# Patient Record
Sex: FEMALE | Race: ASIAN | NOT HISPANIC OR LATINO | Employment: PART TIME | URBAN - METROPOLITAN AREA
[De-identification: names, ages, dates, MRNs, and addresses within clinical notes are randomized per-mention and may not be internally consistent; named-entity substitution may affect disease eponyms.]

---
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
## 2023-02-09 ENCOUNTER — MYC REFILL (OUTPATIENT)
Dept: FAMILY MEDICINE | Facility: CLINIC | Age: 18
End: 2023-02-09

## 2023-02-09 DIAGNOSIS — B07.9 VIRAL WARTS, UNSPECIFIED TYPE: Primary | ICD-10-CM

## 2023-02-10 NOTE — TELEPHONE ENCOUNTER
Routing refill request to provider for review/approval because:  Drug not on the [facility] refill protocol   A break in medication  Patient needs to be seen because it has been more than 1 year since last office visit.    Last Written Prescription Date:  2/22/2018  Last Fill Quantity: 15 g,  # refills: 1   Last office visit provider:  6/29/21     Requested Prescriptions   Pending Prescriptions Disp Refills     salicylic acid (COMPOUND W MAX STRENGTH) 17 % external gel 15 g 1       There is no refill protocol information for this order          Michel Villalobos RN 02/10/23 11:21 AM

## 2023-10-08 ENCOUNTER — HEALTH MAINTENANCE LETTER (OUTPATIENT)
Age: 18
End: 2023-10-08

## 2023-11-19 ENCOUNTER — APPOINTMENT (OUTPATIENT)
Dept: RADIOLOGY | Facility: HOSPITAL | Age: 18
End: 2023-11-19
Payer: COMMERCIAL

## 2023-11-19 ENCOUNTER — HOSPITAL ENCOUNTER (EMERGENCY)
Facility: HOSPITAL | Age: 18
Discharge: HOME | End: 2023-11-19
Attending: EMERGENCY MEDICINE | Admitting: EMERGENCY MEDICINE
Payer: COMMERCIAL

## 2023-11-19 VITALS
HEART RATE: 84 BPM | WEIGHT: 154 LBS | SYSTOLIC BLOOD PRESSURE: 135 MMHG | BODY MASS INDEX: 30.04 KG/M2 | TEMPERATURE: 98.8 F | RESPIRATION RATE: 16 BRPM | OXYGEN SATURATION: 99 % | DIASTOLIC BLOOD PRESSURE: 88 MMHG

## 2023-11-19 DIAGNOSIS — S02.2XXA CLOSED FRACTURE OF NASAL BONE, INITIAL ENCOUNTER: ICD-10-CM

## 2023-11-19 PROCEDURE — 99283 EMERGENCY DEPT VISIT LOW MDM: CPT | Mod: 25

## 2023-11-19 PROCEDURE — 250N000013 HC RX MED GY IP 250 OP 250 PS 637

## 2023-11-19 PROCEDURE — 12011 RPR F/E/E/N/L/M 2.5 CM/<: CPT

## 2023-11-19 PROCEDURE — 70160 X-RAY EXAM OF NASAL BONES: CPT

## 2023-11-19 RX ORDER — IBUPROFEN 600 MG/1
600 TABLET, FILM COATED ORAL ONCE
Status: COMPLETED | OUTPATIENT
Start: 2023-11-19 | End: 2023-11-19

## 2023-11-19 RX ADMIN — IBUPROFEN 600 MG: 600 TABLET, FILM COATED ORAL at 16:59

## 2023-11-19 ASSESSMENT — ACTIVITIES OF DAILY LIVING (ADL): ADLS_ACUITY_SCORE: 35

## 2023-11-19 NOTE — ED PROVIDER NOTES
Emergency Department Encounter   11/19/2023  3:42 PM    CHIEF COMPLAINT: Facial Laceration      IMPRESSION AND PLAN   MEDICAL DECISION MAKING: Alan Hutton is a 18 year old female with no significant medical history who presents to the ED by vehicle for evaluation of facial trauma.  Within the last hour, patient was playing soccer and was struck in the face by a soccer cleat resulting in a small laceration on her nose and significant bleeding from her nose.  Patient is not on blood thinners.  There is no seizure, vomiting, or loss of consciousness following injury.  Currently, epistaxis has ceased.  Per patient, pain is manageable.  Patient is able to explain entire event in her own words, no retrograde amnesia.    Vitals reviewed and hemodynamically stable, mildly tachycardic. On exam patient is well-appearing and in no acute distress.  GCS 15.  There is no evidence of open or depressed skull fracture.  No evidence of basilar skull fracture.  There is no nasal deformity or bony crepitus.  Tenderness and swelling is isolated to the bony bridge of nose.  The patient can breathe through the right right nare but not the left.  There is no nasal deviation.  Epistaxis has ceased.  There is no periorbital ecchymosis or findings suggesting orbital injury.  No septal hematoma.  Patient is able to breathe out of the right nare but not the left nare.  There is a very small 0.5 cm, superficial laceration on the nasal bridge of the nose.    Using the [place] CT head injury/trauma rule, CT head is unnecessary.  I have minimal concern for more extensive injury in the vicinity of the nose or severe facial trauma as there is no diffuse tenderness outside of the nasal bridge, therefore I did not get CT facial bones.  XR nasal bones revealed mildly displaced nasal bone fractures with soft tissue swelling Nasal laceration was repaired with Dermabond and Steri-Strip.  Patient tolerated procedure well.    Patient advised to follow-up with  ENT in 3 to 7 days for additional evaluation.  Vies ibuprofen and acetaminophen for pain relief at home.  Encouraged frequent icing and keeping head upright.  Encouraged frequent blowing of nose.    Medical Decision Making    History:  Supplemental history from: Documented in chart, if applicable and Friend  External Record(s) reviewed: Documented in chart, if applicable.    Work Up:  Chart documentation includes differential considered and any EKGs or imaging independently interpreted by provider, where specified.  In additional to work up documented, I considered the following work up: Documented in chart, if applicable.    External consultation:  Discussion of management with another provider: Documented in chart, if applicable    Complicating factors:  Care impacted by chronic illness: N/A  Care affected by social determinants of health: N/A    Disposition considerations: Discharge. No recommendations on prescription strength medication(s). See documentation for any additional details.      EMERGENCY DEPARTMENT COURSE:  3:55 PM I met and introduced myself to the patient. I gathered initial history and performed my physical exam. We discussed plan for initial workup.   4:15 PM I have staffed the patient with Dr. Kirstie Polanco, ED MD, who has evaluated the patient and agrees with all aspects of today's care.   5:43 PM I rechecked the patient and discussed results, discharge, follow up, and reasons to return to the ED.     At the conclusion of the encounter I discussed the results of all the tests and the disposition. The questions were answered. The patient or family acknowledged understanding and was agreeable with the care plan.    FINAL IMPRESSION:    ICD-10-CM    1. Closed fracture of nasal bone, initial encounter  S02.2XXA             MEDICATIONS GIVEN IN THE EMERGENCY DEPARTMENT:  Medications   ibuprofen (ADVIL/MOTRIN) tablet 600 mg (600 mg Oral $Given 11/19/23 8520)         NEW PRESCRIPTIONS STARTED AT  TODAY'S ED VISIT:  New Prescriptions    No medications on file         HISTORY OF PRESENT ILLNESS   Patient information was obtained from: patient   Use of Intrepreter: N/A     Mindit ADÁN Hutton is a 18 year old female with no significant medical history who presents to the ED by vehicle for evaluation of facial trauma.  This afternoon while playing soccer, patient was struck in the nose by a soccer cleat.  This occurred around 3:30 PM.  After the injury, patient's nose was bleeding from the nares bilaterally.  Since being in the emergency department, bleeding has ceased. Patient denies blood thinner use, seizure after injury, and vomiting after injury.  Was no loss of consciousness.    MEDICAL HISTORY     No past medical history on file.    Past Surgical History:   Procedure Laterality Date    EYE SURGERY Left     In [facility].  globe repair, age 4       Family History   Problem Relation Age of Onset    Asthma Mother        Social History     Tobacco Use    Smoking status: Passive Smoke Exposure - Never Smoker    Smokeless tobacco: Never    Tobacco comments:     Dad smokes outside       clindamycin (CLINDAGEL) 1 % gel  salicylic acid (COMPOUND W MAX STRENGTH) 17 % external gel  tretinoin (RETIN-A) 0.01 % external gel          PHYSICAL EXAM     Vitals:  Patient Vitals for the past 24 hrs:   BP Temp Temp src Pulse Resp SpO2 Weight   11/19/23 1539 (!) 150/94 98.8  F (37.1  C) Temporal 104 20 100 % 69.9 kg (154 lb)         PHYSICAL EXAM:   Physical Exam  Constitutional:       Appearance: She is not ill-appearing or toxic-appearing.   HENT:      Head: Normocephalic and atraumatic. No raccoon eyes, Napier's sign, right periorbital erythema or left periorbital erythema.      Jaw: No tenderness.      Right Ear: Tympanic membrane and ear canal normal. No hemotympanum.      Left Ear: Tympanic membrane and ear canal normal. No hemotympanum.      Nose: Signs of injury, laceration and nasal tenderness present. No nasal deformity or  septal deviation.      Right Nostril: No septal hematoma.      Left Nostril: No septal hematoma.      Right Sinus: No maxillary sinus tenderness or frontal sinus tenderness.      Left Sinus: No maxillary sinus tenderness or frontal sinus tenderness.         RESULTS     LAB:  All pertinent labs reviewed and interpreted  Labs Ordered and Resulted from Time of ED Arrival to Time of ED Departure - No data to display    RADIOLOGY:  XR Nasal Bones 3 Views   Final Result   IMPRESSION: Mildly displaced nasal bone fractures with soft tissue swelling.          PROCEDURES:  PROCEDURE: Laceration Repair   INDICATIONS: Laceration   PROCEDURE PROVIDER: Kari Stewart PA-C   SITE: Nasal bridge   TYPE/SIZE: simple, superficial, clean, and no foreign body visualized  0.5 cm (total length)   FUNCTIONAL ASSESSMENT: Distal sensation, circulation, and motor intact   MEDICATION: None.   PREPARATION: irrigation with Tap water   DEBRIDEMENT: no debridement   CLOSURE:  Superficial layer closed with Steri-strips and Dermabond (medical glue)    Total number of steri-strips placed: 1         Kari Stewart PA-C   Emergency Medicine   [facility] EMERGENCY DEPARTMENT       Kari Stewart PA-C  11/19/23 0479

## 2023-11-19 NOTE — DISCHARGE INSTRUCTIONS
You have been evaluated in the emergency department today for nasal pain following an injury to the face.  Your nasal bone x-ray revealed a mildly displaced nasal bone fracture  with soft tissue swelling.  Your nose is not deformed or displaced enough to do a reduction in the emergency department today.      Please follow-up promptly with an ear nose and throat (ENT) doctor within 3 to 7 days.  For pain management at home, I recommend ice and how elevation.  You can take over-the-counter pain relievers such as ibuprofen and acetaminophen.     Call 911 anytime you think you may need emergency care. For example, call if:    You have trouble breathing.     You passed out (lost consciousness).   Call your doctor now or seek immediate medical care if:    You have signs of infection, such as:  Increased pain, swelling, warmth, or redness.  Red streaks leading from the area.  Pus draining from the area.  A fever.     You have clear fluid draining from your nose.     You have vision changes.     You have swelling or a bump on the thin wall (nasal septum) between the nostrils of your nose.     Your nose is bleeding.     You have new or worse pain.   Watch closely for changes in your health, and be sure to contact your doctor if:    You do not get better as expected.

## 2023-11-19 NOTE — Clinical Note
Brennen was seen and treated in our emergency department on 11/19/2023.  She may return to school on 11/22/2023.  Please make accommodations to reschedule exams.    If you have any questions or concerns, please don't hesitate to call.      Kari Stewart, JESENIA

## 2023-11-19 NOTE — ED PROVIDER NOTES
I, Kirstie Polanco MD have reviewed the documentation, personally taken the patient's history, performed an exam and agree with the physical finds, diagnosis and management plan.  I saw this patient with Kari Stewart PA-C.  ED Course as of 11/19/23 1745   Sun Nov 19, 2023   1641 Patient is a 18-year-old female who comes in today for evaluation of a laceration on her nose.  She was playing soccer and got hit in the nose with a cleat.  She has a small 5 mm laceration.  She has tenderness over the bridge of the nose with some swelling but no obvious deformity.  There is no septal hematoma.  We will get a nasal bone x-ray and repair the laceration likely with just glue.  I discussed this with the patient.  She is in agreement with the plan will be discharged home afterward.   1745 Patient has a mildly displaced nasal bone fracture.  She can follow-up with ENT as an outpatient.       I personally saw the patient and performed a substantive portion of the visit including all aspects of the medical decision making.      Kirstie Polanco MD  11/19/23 1745

## 2023-11-19 NOTE — ED TRIAGE NOTES
Patient here after kicked in the nose with a soccer cleat while playing soccer today, has laceration there. Patient reports numbness in the nose and a lot of epistaxis after the event.

## 2023-12-08 ENCOUNTER — HOSPITAL ENCOUNTER (EMERGENCY)
Facility: CLINIC | Age: 18
End: 2023-12-08
Payer: COMMERCIAL

## 2023-12-08 NOTE — ED TRIAGE NOTES
Pt presents with complaints of lower abd pain. Pt started menstrual cycle today. Pt reports she is feeling faint with pain.

## 2024-08-06 ENCOUNTER — OFFICE VISIT (OUTPATIENT)
Dept: FAMILY MEDICINE | Facility: CLINIC | Age: 19
End: 2024-08-06
Payer: COMMERCIAL

## 2024-08-06 VITALS
SYSTOLIC BLOOD PRESSURE: 108 MMHG | WEIGHT: 153.2 LBS | HEART RATE: 91 BPM | HEIGHT: 60 IN | TEMPERATURE: 98 F | BODY MASS INDEX: 30.08 KG/M2 | DIASTOLIC BLOOD PRESSURE: 67 MMHG | RESPIRATION RATE: 16 BRPM | OXYGEN SATURATION: 99 %

## 2024-08-06 DIAGNOSIS — Z12.4 CERVICAL CANCER SCREENING: ICD-10-CM

## 2024-08-06 DIAGNOSIS — L70.0 ACNE VULGARIS: ICD-10-CM

## 2024-08-06 DIAGNOSIS — B07.9 VIRAL WARTS, UNSPECIFIED TYPE: ICD-10-CM

## 2024-08-06 DIAGNOSIS — Z11.3 SCREENING FOR STDS (SEXUALLY TRANSMITTED DISEASES): ICD-10-CM

## 2024-08-06 DIAGNOSIS — Z00.00 ROUTINE GENERAL MEDICAL EXAMINATION AT A HEALTH CARE FACILITY: Primary | ICD-10-CM

## 2024-08-06 DIAGNOSIS — Z11.1 SCREENING EXAMINATION FOR PULMONARY TUBERCULOSIS: ICD-10-CM

## 2024-08-06 PROCEDURE — 90471 IMMUNIZATION ADMIN: CPT | Performed by: PHYSICIAN ASSISTANT

## 2024-08-06 PROCEDURE — 36415 COLL VENOUS BLD VENIPUNCTURE: CPT | Performed by: PHYSICIAN ASSISTANT

## 2024-08-06 PROCEDURE — 99214 OFFICE O/P EST MOD 30 MIN: CPT | Mod: 25 | Performed by: PHYSICIAN ASSISTANT

## 2024-08-06 PROCEDURE — 87591 N.GONORRHOEAE DNA AMP PROB: CPT | Performed by: PHYSICIAN ASSISTANT

## 2024-08-06 PROCEDURE — 87491 CHLMYD TRACH DNA AMP PROBE: CPT | Performed by: PHYSICIAN ASSISTANT

## 2024-08-06 PROCEDURE — 86481 TB AG RESPONSE T-CELL SUSP: CPT | Performed by: PHYSICIAN ASSISTANT

## 2024-08-06 PROCEDURE — 99385 PREV VISIT NEW AGE 18-39: CPT | Mod: 25 | Performed by: PHYSICIAN ASSISTANT

## 2024-08-06 PROCEDURE — G0145 SCR C/V CYTO,THINLAYER,RESCR: HCPCS | Performed by: PHYSICIAN ASSISTANT

## 2024-08-06 PROCEDURE — 90715 TDAP VACCINE 7 YRS/> IM: CPT | Performed by: PHYSICIAN ASSISTANT

## 2024-08-06 RX ORDER — CLINDAMYCIN PHOSPHATE 10 MG/G
GEL TOPICAL 2 TIMES DAILY
Qty: 30 G | Refills: 3 | Status: SHIPPED | OUTPATIENT
Start: 2024-08-06

## 2024-08-06 RX ORDER — TRETINOIN 0.1 MG/G
GEL TOPICAL AT BEDTIME
Qty: 45 G | Refills: 11 | Status: SHIPPED | OUTPATIENT
Start: 2024-08-06

## 2024-08-06 SDOH — HEALTH STABILITY: PHYSICAL HEALTH: ON AVERAGE, HOW MANY DAYS PER WEEK DO YOU ENGAGE IN MODERATE TO STRENUOUS EXERCISE (LIKE A BRISK WALK)?: 3 DAYS

## 2024-08-06 ASSESSMENT — SOCIAL DETERMINANTS OF HEALTH (SDOH): HOW OFTEN DO YOU GET TOGETHER WITH FRIENDS OR RELATIVES?: ONCE A WEEK

## 2024-08-06 NOTE — PROGRESS NOTES
"Preventive Care Visit  [facility]  Shelby Hathaway PA-C, Physician Assistant - Medical  Aug 6, 2024      Assessment & Plan     Routine general medical examination at a health care facility  Pap updated today.  Tdap updated today.  Form filled out for school.      Viral warts, unspecified type  Chronic issue, stable, meds refilled.  - salicylic acid (COMPOUND W MAX STRENGTH) 17 % external gel  Dispense: 15 g; Refill: 1    Acne vulgaris  Chronic issue, stable, meds refilled.  - clindamycin (CLEOCIN-T) 1 % external gel  Dispense: 30 g; Refill: 3  - tretinoin (RETIN-A) 0.01 % external gel  Dispense: 45 g; Refill: 11    Screening for STDs (sexually transmitted diseases)  Asymptomatic, updated today.  - Chlamydia trachomatis/Neisseria gonorrhoeae by PCR- VAGINAL SELF-SWAB    Cervical cancer screening  Pap done today, first pap. Plan repeat in 3 years if negative.  - Pap Screen Only - Recommended Age 21 - 24 Years    Screening examination for pulmonary tuberculosis  TB screening done for school.  - Quantiferon TB Gold Plus      Patient has been advised of split billing requirements and indicates understanding: Yes        BMI  Estimated body mass index is 29.67 kg/m  as calculated from the following:    Height as of this encounter: 1.53 m (5' 0.25\").    Weight as of this encounter: 69.5 kg (153 lb 3.2 oz).   Weight management plan: Discussed healthy diet and exercise guidelines    Counseling  Appropriate preventive services were addressed with this patient via screening, questionnaire, or discussion as appropriate for fall prevention, nutrition, physical activity, Tobacco-use cessation, weight loss and cognition.  Checklist reviewing preventive services available has been given to the patient.  Reviewed patient's diet, addressing concerns and/or questions.   She is at risk for lack of exercise and has been provided with information to increase physical activity for the benefit of her " well-being.   The patient was instructed to see the dentist every 6 months.       Risks, benefits and alternatives were discussed with patient. Agreeable to the plan of care.      Subjective   Hsat is a 19 year old, presenting for the following:  Physical        8/6/2024     1:26 PM   Additional Questions   Roomed by DAMARIS Vee CMA([facility])         8/6/2024   Forms   Any forms needing to be completed Yes           Health Care Directive  Patient does not have a Health Care Directive or Living Will: Discussed advance care planning with patient; however, patient declined at this time.    HPI        8/6/2024   General Health   How would you rate your overall physical health? Good   Feel stress (tense, anxious, or unable to sleep) To some extent      (!) STRESS CONCERN      8/6/2024   Nutrition   Three or more servings of calcium each day? Yes   Diet: Regular (no restrictions)   How many servings of fruit and vegetables per day? (!) 2-3   How many sweetened beverages each day? 0-1            8/6/2024   Exercise   Days per week of moderate/strenous exercise 3 days            8/6/2024   Social Factors   Frequency of gathering with friends or relatives Once a week   Worry food won't last until get money to buy more No   Food not last or not have enough money for food? No   Do you have housing? (Housing is defined as stable permanent housing and does not include staying ouside in a car, in a tent, in an abandoned building, in an overnight shelter, or couch-surfing.) Yes   Are you worried about losing your housing? No   Lack of transportation? No   Unable to get utilities (heat,electricity)? No            8/6/2024   Dental   Dentist two times every year? (!) NO            8/6/2024   TB Screening   Were you born outside of the [place]? Yes            Today's PHQ-2 Score:       8/6/2024     1:00 PM   PHQ-2 ( 1999 [employer])   Q1: Little interest or pleasure in doing things 1   Q2: Feeling down, depressed or hopeless 0   PHQ-2 Score 1   Q1:  "Little interest or pleasure in doing things Several days   Q2: Feeling down, depressed or hopeless Not at all   PHQ-2 Score 1           8/6/2024   Substance Use   Alcohol more than 3/day or more than 7/wk No   Do you use any other substances recreationally? No        Social History     Tobacco Use    Smoking status: Passive Smoke Exposure - Never Smoker    Smokeless tobacco: Never    Tobacco comments:     Dad smokes outside             8/6/2024   One time HIV Screening   Previous HIV test? No          8/6/2024   STI Screening   New sexual partner(s) since last STI/HIV test? No        History of abnormal Pap smear: No - age 21-29 PAP every 3 years recommended             8/6/2024   Contraception/Family Planning   Questions about contraception or family planning No           Reviewed and updated as needed this visit by Provider                    Patient Active Problem List   Diagnosis    Right knee pain     Past Surgical History:   Procedure Laterality Date    EYE SURGERY Left     In [place].  globe repair, age 4       Social History     Tobacco Use    Smoking status: Passive Smoke Exposure - Never Smoker    Smokeless tobacco: Never    Tobacco comments:     Dad smokes outside   Substance Use Topics    Alcohol use: Not on file     Family History   Problem Relation Age of Onset    Asthma Mother          Current Outpatient Medications   Medication Sig Dispense Refill    clindamycin (CLEOCIN-T) 1 % external gel Apply topically 2 times daily 30 g 3    salicylic acid (COMPOUND W MAX STRENGTH) 17 % external gel Apply topically daily 15 g 1    tretinoin (RETIN-A) 0.01 % external gel Apply topically at bedtime 45 g 11     No Known Allergies      Review of Systems  Constitutional, HEENT, cardiovascular, pulmonary, gi and gu systems are negative, except as otherwise noted.     Objective    Exam  /67   Pulse 91   Temp 98  F (36.7  C) (Oral)   Resp 16   Ht 1.53 m (5' 0.25\")   Wt 69.5 kg (153 lb 3.2 oz)   LMP " "07/23/2024 (Exact Date)   SpO2 99%   BMI 29.67 kg/m     Estimated body mass index is 29.67 kg/m  as calculated from the following:    Height as of this encounter: 1.53 m (5' 0.25\").    Weight as of this encounter: 69.5 kg (153 lb 3.2 oz).    Physical Exam  GENERAL: alert and no distress  EYES: Eyes grossly normal to inspection, PERRL and conjunctivae and sclerae normal  HENT: ear canals and TM's normal, nose and mouth without ulcers or lesions  NECK: no adenopathy, no asymmetry, masses, or scars  RESP: lungs clear to auscultation - no rales, rhonchi or wheezes  BREAST: normal without masses, tenderness or nipple discharge and no palpable axillary masses or adenopathy  CV: regular rate and rhythm, normal S1 S2, no S3 or S4, no murmur, click or rub, no peripheral edema  ABDOMEN: soft, nontender, no hepatosplenomegaly, no masses and bowel sounds normal   (female): normal female external genitalia, normal urethral meatus, normal vaginal mucosa  MS: no gross musculoskeletal defects noted, no edema  SKIN: no suspicious lesions or rashes  NEURO: Normal strength and tone, mentation intact and speech normal  PSYCH: mentation appears normal, affect normal/bright        Signed Electronically by: Shelby Hathaway PA-C    "

## 2024-08-06 NOTE — PATIENT INSTRUCTIONS
Patient Education   Preventive Care Advice   This is general advice given by our system to help you stay healthy. However, your care team may have specific advice just for you. Please talk to your care team about your preventive care needs.  Nutrition  Eat 5 or more servings of fruits and vegetables each day.  Try wheat bread, brown rice and whole grain pasta (instead of white bread, rice, and pasta).  Get enough calcium and vitamin D. Check the label on foods and aim for 100% of the RDA (recommended daily allowance).  Lifestyle  Exercise at least 150 minutes each week  (30 minutes a day, 5 days a week).  Do muscle strengthening activities 2 days a week. These help control your weight and prevent disease.  No smoking.  Wear sunscreen to prevent skin cancer.  Have a dental exam and cleaning every 6 months.  Yearly exams  See your health care team every year to talk about:  Any changes in your health.  Any medicines your care team has prescribed.  Preventive care, family planning, and ways to prevent chronic diseases.  Shots (vaccines)   HPV shots (up to age 26), if you've never had them before.  Hepatitis B shots (up to age 59), if you've never had them before.  COVID-19 shot: Get this shot when it's due.  Flu shot: Get a flu shot every year.  Tetanus shot: Get a tetanus shot every 10 years.  Pneumococcal, hepatitis A, and RSV shots: Ask your care team if you need these based on your risk.  Shingles shot (for age 50 and up)  General health tests  Diabetes screening:  Starting at age 35, Get screened for diabetes at least every 3 years.  If you are younger than age 35, ask your care team if you should be screened for diabetes.  Cholesterol test: At age 39, start having a cholesterol test every 5 years, or more often if advised.  Bone density scan (DEXA): At age 50, ask your care team if you should have this scan for osteoporosis (brittle bones).  Hepatitis C: Get tested at least once in your life.  STIs (sexually  transmitted infections)  Before age 24: Ask your care team if you should be screened for STIs.  After age 24: Get screened for STIs if you're at risk. You are at risk for STIs (including HIV) if:  You are sexually active with more than one person.  You don't use condoms every time.  You or a partner was diagnosed with a sexually transmitted infection.  If you are at risk for HIV, ask about PrEP medicine to prevent HIV.  Get tested for HIV at least once in your life, whether you are at risk for HIV or not.  Cancer screening tests  Cervical cancer screening: If you have a cervix, begin getting regular cervical cancer screening tests starting at age 21.  Breast cancer scan (mammogram): If you've ever had breasts, begin having regular mammograms starting at age 40. This is a scan to check for breast cancer.  Colon cancer screening: It is important to start screening for colon cancer at age 45.  Have a colonoscopy test every 10 years (or more often if you're at risk) Or, ask your provider about stool tests like a FIT test every year or Cologuard test every 3 years.  To learn more about your testing options, visit:   .  For help making a decision, visit:   https://bit.ly/ml67041.  Prostate cancer screening test: If you have a prostate, ask your care team if a prostate cancer screening test (PSA) at age 55 is right for you.  Lung cancer screening: If you are a current or former smoker ages 50 to 80, ask your care team if ongoing lung cancer screenings are right for you.  For informational purposes only. Not to replace the advice of your health care provider. Copyright   2023 [employer]. All rights reserved. Clinically reviewed by the [facility]. [employer] 934228 - REV 01/24.

## 2024-08-06 NOTE — PROGRESS NOTES
Prior to immunization administration, verified patients identity using patient s name and date of birth. Please see Immunization Activity for additional information.     Screening Questionnaire for Adult Immunization    Are you sick today?   No   Do you have allergies to medications, food, a vaccine component or latex?   No   Have you ever had a serious reaction after receiving a vaccination?   No   Do you have a long-term health problem with heart, lung, kidney, or metabolic disease (e.g., diabetes), asthma, a blood disorder, no spleen, complement component deficiency, a cochlear implant, or a spinal fluid leak?  Are you on long-term aspirin therapy?   No   Do you have cancer, leukemia, HIV/AIDS, or any other immune system problem?   No   Do you have a parent, brother, or sister with an immune system problem?   No   In the past 3 months, have you taken medications that affect  your immune system, such as prednisone, other steroids, or anticancer drugs; drugs for the treatment of rheumatoid arthritis, Crohn s disease, or psoriasis; or have you had radiation treatments?   No   Have you had a seizure, or a brain or other nervous system problem?   No   During the past year, have you received a transfusion of blood or blood    products, or been given immune (gamma) globulin or antiviral drug?   No   For women: Are you pregnant or is there a chance you could become       pregnant during the next month?   No   Have you received any vaccinations in the past 4 weeks?   No     Immunization questionnaire answers were all negative.      Patient instructed to remain in clinic for 15 minutes afterwards, and to report any adverse reactions.     Screening performed by Ana Paula Vee MA on 8/6/2024 at 2:03 PM.

## 2024-08-07 LAB
C TRACH DNA SPEC QL PROBE+SIG AMP: NEGATIVE
N GONORRHOEA DNA SPEC QL NAA+PROBE: NEGATIVE

## 2024-08-08 LAB
GAMMA INTERFERON BACKGROUND BLD IA-ACNC: 0.08 IU/ML
M TB IFN-G BLD-IMP: NEGATIVE
M TB IFN-G CD4+ BCKGRND COR BLD-ACNC: 9.92 IU/ML
MITOGEN IGNF BCKGRD COR BLD-ACNC: 0.02 IU/ML
MITOGEN IGNF BCKGRD COR BLD-ACNC: 0.03 IU/ML
QUANTIFERON MITOGEN: 10 IU/ML
QUANTIFERON NIL TUBE: 0.08 IU/ML
QUANTIFERON TB1 TUBE: 0.1 IU/ML
QUANTIFERON TB2 TUBE: 0.11

## 2024-08-09 LAB
BKR LAB AP GYN ADEQUACY: NORMAL
BKR LAB AP GYN INTERPRETATION: NORMAL
BKR LAB AP HPV REFLEX: NO
BKR LAB AP LMP: NORMAL
BKR LAB AP PREVIOUS ABNORMAL: NORMAL
PATH REPORT.COMMENTS IMP SPEC: NORMAL
PATH REPORT.COMMENTS IMP SPEC: NORMAL
PATH REPORT.RELEVANT HX SPEC: NORMAL

## 2025-06-06 LAB
ALBUMIN SERPL BCG-MCNC: 4.5 G/DL (ref 3.5–5.2)
ALP SERPL-CCNC: 51 U/L (ref 40–150)
ALT SERPL W P-5'-P-CCNC: 45 U/L (ref 0–50)
ANION GAP SERPL CALCULATED.3IONS-SCNC: 15 MMOL/L (ref 7–15)
AST SERPL W P-5'-P-CCNC: 22 U/L (ref 0–45)
BASOPHILS # BLD AUTO: 0 10E3/UL (ref 0–0.2)
BASOPHILS NFR BLD AUTO: 0 %
BILIRUB SERPL-MCNC: 0.5 MG/DL
BUN SERPL-MCNC: 12.8 MG/DL (ref 6–20)
CALCIUM SERPL-MCNC: 9.5 MG/DL (ref 8.8–10.4)
CHLORIDE SERPL-SCNC: 99 MMOL/L (ref 98–107)
CREAT SERPL-MCNC: 0.89 MG/DL (ref 0.51–0.95)
EGFRCR SERPLBLD CKD-EPI 2021: >90 ML/MIN/1.73M2
EOSINOPHIL # BLD AUTO: 0 10E3/UL (ref 0–0.7)
EOSINOPHIL NFR BLD AUTO: 0 %
ERYTHROCYTE [DISTWIDTH] IN BLOOD BY AUTOMATED COUNT: 12.7 % (ref 10–15)
GLUCOSE SERPL-MCNC: 123 MG/DL (ref 70–99)
HCG SERPL QL: NEGATIVE
HCO3 SERPL-SCNC: 19 MMOL/L (ref 22–29)
HCT VFR BLD AUTO: 38.5 % (ref 35–47)
HGB BLD-MCNC: 12.5 G/DL (ref 11.7–15.7)
IMM GRANULOCYTES # BLD: 0.1 10E3/UL
IMM GRANULOCYTES NFR BLD: 1 %
LACTATE SERPL-SCNC: 3.2 MMOL/L (ref 0.7–2)
LYMPHOCYTES # BLD AUTO: 0.5 10E3/UL (ref 0.8–5.3)
LYMPHOCYTES NFR BLD AUTO: 4 %
MAGNESIUM SERPL-MCNC: 1.7 MG/DL (ref 1.7–2.3)
MCH RBC QN AUTO: 26.8 PG (ref 26.5–33)
MCHC RBC AUTO-ENTMCNC: 32.5 G/DL (ref 31.5–36.5)
MCV RBC AUTO: 83 FL (ref 78–100)
MONOCYTES # BLD AUTO: 0.5 10E3/UL (ref 0–1.3)
MONOCYTES NFR BLD AUTO: 4 %
NEUTROPHILS # BLD AUTO: 10.8 10E3/UL (ref 1.6–8.3)
NEUTROPHILS NFR BLD AUTO: 91 %
NRBC # BLD AUTO: 0 10E3/UL
NRBC BLD AUTO-RTO: 0 /100
PLATELET # BLD AUTO: 522 10E3/UL (ref 150–450)
POTASSIUM SERPL-SCNC: 4 MMOL/L (ref 3.4–5.3)
PROT SERPL-MCNC: 8.1 G/DL (ref 6.4–8.3)
RBC # BLD AUTO: 4.66 10E6/UL (ref 3.8–5.2)
SODIUM SERPL-SCNC: 133 MMOL/L (ref 135–145)
WBC # BLD AUTO: 12 10E3/UL (ref 4–11)

## 2025-06-06 PROCEDURE — 83735 ASSAY OF MAGNESIUM: CPT | Performed by: EMERGENCY MEDICINE

## 2025-06-06 PROCEDURE — 80048 BASIC METABOLIC PNL TOTAL CA: CPT | Performed by: EMERGENCY MEDICINE

## 2025-06-06 PROCEDURE — 84703 CHORIONIC GONADOTROPIN ASSAY: CPT | Performed by: EMERGENCY MEDICINE

## 2025-06-06 PROCEDURE — 96361 HYDRATE IV INFUSION ADD-ON: CPT | Performed by: EMERGENCY MEDICINE

## 2025-06-06 PROCEDURE — 258N000003 HC RX IP 258 OP 636: Performed by: EMERGENCY MEDICINE

## 2025-06-06 PROCEDURE — 83605 ASSAY OF LACTIC ACID: CPT | Performed by: EMERGENCY MEDICINE

## 2025-06-06 PROCEDURE — 36415 COLL VENOUS BLD VENIPUNCTURE: CPT | Performed by: EMERGENCY MEDICINE

## 2025-06-06 PROCEDURE — 99285 EMERGENCY DEPT VISIT HI MDM: CPT | Mod: 25 | Performed by: EMERGENCY MEDICINE

## 2025-06-06 PROCEDURE — 85004 AUTOMATED DIFF WBC COUNT: CPT | Performed by: EMERGENCY MEDICINE

## 2025-06-06 RX ORDER — KETOROLAC TROMETHAMINE 15 MG/ML
15 INJECTION, SOLUTION INTRAMUSCULAR; INTRAVENOUS ONCE
Status: COMPLETED | OUTPATIENT
Start: 2025-06-07 | End: 2025-06-07

## 2025-06-06 RX ORDER — ONDANSETRON 2 MG/ML
4 INJECTION INTRAMUSCULAR; INTRAVENOUS ONCE
Status: COMPLETED | OUTPATIENT
Start: 2025-06-07 | End: 2025-06-07

## 2025-06-06 RX ADMIN — SODIUM CHLORIDE 1000 ML: 0.9 INJECTION, SOLUTION INTRAVENOUS at 22:52

## 2025-06-06 ASSESSMENT — COLUMBIA-SUICIDE SEVERITY RATING SCALE - C-SSRS
1. IN THE PAST MONTH, HAVE YOU WISHED YOU WERE DEAD OR WISHED YOU COULD GO TO SLEEP AND NOT WAKE UP?: NO
2. HAVE YOU ACTUALLY HAD ANY THOUGHTS OF KILLING YOURSELF IN THE PAST MONTH?: NO
6. HAVE YOU EVER DONE ANYTHING, STARTED TO DO ANYTHING, OR PREPARED TO DO ANYTHING TO END YOUR LIFE?: NO

## 2025-06-07 ENCOUNTER — HOSPITAL ENCOUNTER (EMERGENCY)
Facility: HOSPITAL | Age: 20
Discharge: HOME | End: 2025-06-07
Attending: EMERGENCY MEDICINE | Admitting: EMERGENCY MEDICINE
Payer: COMMERCIAL

## 2025-06-07 ENCOUNTER — HOSPITAL ENCOUNTER (OUTPATIENT)
Facility: HOSPITAL | Age: 20
Setting detail: OBSERVATION
Discharge: HOME | End: 2025-06-07
Attending: EMERGENCY MEDICINE | Admitting: INTERNAL MEDICINE
Payer: COMMERCIAL

## 2025-06-07 ENCOUNTER — APPOINTMENT (OUTPATIENT)
Dept: CT IMAGING | Facility: HOSPITAL | Age: 20
End: 2025-06-07
Attending: EMERGENCY MEDICINE
Payer: COMMERCIAL

## 2025-06-07 VITALS
RESPIRATION RATE: 18 BRPM | HEART RATE: 99 BPM | SYSTOLIC BLOOD PRESSURE: 110 MMHG | DIASTOLIC BLOOD PRESSURE: 66 MMHG | OXYGEN SATURATION: 98 % | HEIGHT: 60 IN | WEIGHT: 168 LBS | TEMPERATURE: 99 F | BODY MASS INDEX: 32.98 KG/M2

## 2025-06-07 VITALS
WEIGHT: 167.7 LBS | HEART RATE: 101 BPM | HEIGHT: 60 IN | TEMPERATURE: 99.4 F | OXYGEN SATURATION: 98 % | BODY MASS INDEX: 32.93 KG/M2 | SYSTOLIC BLOOD PRESSURE: 101 MMHG | DIASTOLIC BLOOD PRESSURE: 62 MMHG | RESPIRATION RATE: 16 BRPM

## 2025-06-07 DIAGNOSIS — R19.7 VOMITING AND DIARRHEA: ICD-10-CM

## 2025-06-07 DIAGNOSIS — R10.84 GENERALIZED ABDOMINAL PAIN: ICD-10-CM

## 2025-06-07 DIAGNOSIS — R11.2 NAUSEA AND VOMITING, UNSPECIFIED VOMITING TYPE: ICD-10-CM

## 2025-06-07 DIAGNOSIS — R11.10 VOMITING AND DIARRHEA: ICD-10-CM

## 2025-06-07 DIAGNOSIS — R19.7 DIARRHEA, UNSPECIFIED TYPE: ICD-10-CM

## 2025-06-07 DIAGNOSIS — R42 LIGHTHEADEDNESS: ICD-10-CM

## 2025-06-07 DIAGNOSIS — K52.9 GASTROENTERITIS: ICD-10-CM

## 2025-06-07 DIAGNOSIS — R00.0 TACHYCARDIA: ICD-10-CM

## 2025-06-07 PROBLEM — E87.20 LACTIC ACIDOSIS: Status: ACTIVE | Noted: 2025-06-07

## 2025-06-07 PROBLEM — B07.8 OTHER VIRAL WARTS: Status: RESOLVED | Noted: 2025-06-07 | Resolved: 2025-06-07

## 2025-06-07 PROBLEM — E87.1 HYPONATREMIA: Status: ACTIVE | Noted: 2025-06-07

## 2025-06-07 PROBLEM — E86.1 HYPOTENSION DUE TO HYPOVOLEMIA: Status: ACTIVE | Noted: 2025-06-07

## 2025-06-07 PROBLEM — M25.561 RIGHT KNEE PAIN: Status: RESOLVED | Noted: 2018-01-31 | Resolved: 2025-06-07

## 2025-06-07 PROBLEM — A41.9 SEPSIS WITHOUT ACUTE ORGAN DYSFUNCTION (H): Status: ACTIVE | Noted: 2025-06-07

## 2025-06-07 PROBLEM — A09 ENTERITIS OF INFECTIOUS ORIGIN: Status: ACTIVE | Noted: 2025-06-07

## 2025-06-07 PROBLEM — R65.10 SIRS (SYSTEMIC INFLAMMATORY RESPONSE SYNDROME) (H): Status: ACTIVE | Noted: 2025-06-07

## 2025-06-07 PROBLEM — R31.9 HEMATURIA: Status: RESOLVED | Noted: 2017-04-24 | Resolved: 2025-06-07

## 2025-06-07 PROBLEM — L70.8 OTHER ACNE: Status: RESOLVED | Noted: 2018-01-11 | Resolved: 2025-06-07

## 2025-06-07 PROBLEM — S89.91XA RIGHT KNEE INJURY: Status: RESOLVED | Noted: 2018-01-11 | Resolved: 2025-06-07

## 2025-06-07 LAB
ALBUMIN SERPL BCG-MCNC: 3.8 G/DL (ref 3.5–5.2)
ALBUMIN UR-MCNC: 20 MG/DL
ALP SERPL-CCNC: 42 U/L (ref 40–150)
ALT SERPL W P-5'-P-CCNC: 35 U/L (ref 0–50)
ANION GAP SERPL CALCULATED.3IONS-SCNC: 13 MMOL/L (ref 7–15)
APPEARANCE UR: ABNORMAL
AST SERPL W P-5'-P-CCNC: 24 U/L (ref 0–45)
BACTERIA #/AREA URNS HPF: ABNORMAL /HPF
BASOPHILS # BLD AUTO: 0 10E3/UL (ref 0–0.2)
BASOPHILS NFR BLD AUTO: 0 %
BILIRUB DIRECT SERPL-MCNC: 0.11 MG/DL (ref 0–0.3)
BILIRUB SERPL-MCNC: 0.3 MG/DL
BILIRUB UR QL STRIP: NEGATIVE
BUN SERPL-MCNC: 11.1 MG/DL (ref 6–20)
CALCIUM SERPL-MCNC: 8.8 MG/DL (ref 8.8–10.4)
CHLORIDE SERPL-SCNC: 105 MMOL/L (ref 98–107)
COLOR UR AUTO: ABNORMAL
CREAT SERPL-MCNC: 0.91 MG/DL (ref 0.51–0.95)
EGFRCR SERPLBLD CKD-EPI 2021: >90 ML/MIN/1.73M2
EOSINOPHIL # BLD AUTO: 0 10E3/UL (ref 0–0.7)
EOSINOPHIL NFR BLD AUTO: 0 %
ERYTHROCYTE [DISTWIDTH] IN BLOOD BY AUTOMATED COUNT: 12.8 % (ref 10–15)
GLUCOSE SERPL-MCNC: 113 MG/DL (ref 70–99)
GLUCOSE UR STRIP-MCNC: NEGATIVE MG/DL
HCO3 SERPL-SCNC: 16 MMOL/L (ref 22–29)
HCT VFR BLD AUTO: 35.8 % (ref 35–47)
HGB BLD-MCNC: 11.7 G/DL (ref 11.7–15.7)
HGB UR QL STRIP: NEGATIVE
HOLD SPECIMEN: NORMAL
IMM GRANULOCYTES # BLD: 0 10E3/UL
IMM GRANULOCYTES NFR BLD: 1 %
KETONES UR STRIP-MCNC: NEGATIVE MG/DL
LACTATE SERPL-SCNC: 1.1 MMOL/L (ref 0.7–2)
LACTATE SERPL-SCNC: 1.8 MMOL/L (ref 0.7–2)
LACTATE SERPL-SCNC: 2.3 MMOL/L (ref 0.7–2)
LEUKOCYTE ESTERASE UR QL STRIP: ABNORMAL
LIPASE SERPL-CCNC: 16 U/L (ref 13–60)
LYMPHOCYTES # BLD AUTO: 0.7 10E3/UL (ref 0.8–5.3)
LYMPHOCYTES NFR BLD AUTO: 13 %
MAGNESIUM SERPL-MCNC: 1.7 MG/DL (ref 1.7–2.3)
MCH RBC QN AUTO: 27.1 PG (ref 26.5–33)
MCHC RBC AUTO-ENTMCNC: 32.7 G/DL (ref 31.5–36.5)
MCV RBC AUTO: 83 FL (ref 78–100)
MONOCYTES # BLD AUTO: 0.3 10E3/UL (ref 0–1.3)
MONOCYTES NFR BLD AUTO: 6 %
MUCOUS THREADS #/AREA URNS LPF: PRESENT /LPF
NEUTROPHILS # BLD AUTO: 4.4 10E3/UL (ref 1.6–8.3)
NEUTROPHILS NFR BLD AUTO: 81 %
NITRATE UR QL: NEGATIVE
NRBC # BLD AUTO: 0 10E3/UL
NRBC BLD AUTO-RTO: 0 /100
PH UR STRIP: 5.5 [PH] (ref 5–7)
PLATELET # BLD AUTO: 407 10E3/UL (ref 150–450)
POTASSIUM SERPL-SCNC: 3.8 MMOL/L (ref 3.4–5.3)
PROT SERPL-MCNC: 7.3 G/DL (ref 6.4–8.3)
RBC # BLD AUTO: 4.31 10E6/UL (ref 3.8–5.2)
RBC URINE: 0 /HPF
SODIUM SERPL-SCNC: 134 MMOL/L (ref 135–145)
SP GR UR STRIP: 1.01 (ref 1–1.03)
SQUAMOUS EPITHELIAL: 4 /HPF
UROBILINOGEN UR STRIP-MCNC: NORMAL MG/DL
WBC # BLD AUTO: 5.4 10E3/UL (ref 4–11)
WBC URINE: 12 /HPF
YEAST #/AREA URNS HPF: ABNORMAL /HPF

## 2025-06-07 PROCEDURE — 83690 ASSAY OF LIPASE: CPT | Performed by: EMERGENCY MEDICINE

## 2025-06-07 PROCEDURE — 96361 HYDRATE IV INFUSION ADD-ON: CPT

## 2025-06-07 PROCEDURE — 250N000013 HC RX MED GY IP 250 OP 250 PS 637: Performed by: EMERGENCY MEDICINE

## 2025-06-07 PROCEDURE — 96376 TX/PRO/DX INJ SAME DRUG ADON: CPT

## 2025-06-07 PROCEDURE — 36415 COLL VENOUS BLD VENIPUNCTURE: CPT | Performed by: EMERGENCY MEDICINE

## 2025-06-07 PROCEDURE — 250N000011 HC RX IP 250 OP 636: Performed by: EMERGENCY MEDICINE

## 2025-06-07 PROCEDURE — 74177 CT ABD & PELVIS W/CONTRAST: CPT

## 2025-06-07 PROCEDURE — 87086 URINE CULTURE/COLONY COUNT: CPT | Performed by: EMERGENCY MEDICINE

## 2025-06-07 PROCEDURE — 258N000003 HC RX IP 258 OP 636: Performed by: EMERGENCY MEDICINE

## 2025-06-07 PROCEDURE — 80048 BASIC METABOLIC PNL TOTAL CA: CPT | Performed by: EMERGENCY MEDICINE

## 2025-06-07 PROCEDURE — 85025 COMPLETE CBC W/AUTO DIFF WBC: CPT | Performed by: EMERGENCY MEDICINE

## 2025-06-07 PROCEDURE — 83605 ASSAY OF LACTIC ACID: CPT | Performed by: EMERGENCY MEDICINE

## 2025-06-07 PROCEDURE — 81001 URINALYSIS AUTO W/SCOPE: CPT | Performed by: EMERGENCY MEDICINE

## 2025-06-07 PROCEDURE — 96374 THER/PROPH/DIAG INJ IV PUSH: CPT

## 2025-06-07 PROCEDURE — 83735 ASSAY OF MAGNESIUM: CPT | Performed by: EMERGENCY MEDICINE

## 2025-06-07 PROCEDURE — 87040 BLOOD CULTURE FOR BACTERIA: CPT | Performed by: EMERGENCY MEDICINE

## 2025-06-07 PROCEDURE — 82248 BILIRUBIN DIRECT: CPT | Performed by: EMERGENCY MEDICINE

## 2025-06-07 PROCEDURE — 99285 EMERGENCY DEPT VISIT HI MDM: CPT | Mod: 25

## 2025-06-07 PROCEDURE — G0378 HOSPITAL OBSERVATION PER HR: HCPCS

## 2025-06-07 PROCEDURE — 96375 TX/PRO/DX INJ NEW DRUG ADDON: CPT | Mod: 59 | Performed by: EMERGENCY MEDICINE

## 2025-06-07 PROCEDURE — 96374 THER/PROPH/DIAG INJ IV PUSH: CPT | Mod: 59 | Performed by: EMERGENCY MEDICINE

## 2025-06-07 PROCEDURE — 96361 HYDRATE IV INFUSION ADD-ON: CPT | Performed by: EMERGENCY MEDICINE

## 2025-06-07 RX ORDER — AMOXICILLIN 250 MG
2 CAPSULE ORAL 2 TIMES DAILY PRN
Status: DISCONTINUED | OUTPATIENT
Start: 2025-06-07 | End: 2025-06-08 | Stop reason: HOSPADM

## 2025-06-07 RX ORDER — DICYCLOMINE HCL 20 MG
20 TABLET ORAL ONCE
Status: COMPLETED | OUTPATIENT
Start: 2025-06-07 | End: 2025-06-07

## 2025-06-07 RX ORDER — HYDROXYZINE HYDROCHLORIDE 25 MG/1
25 TABLET, FILM COATED ORAL EVERY 6 HOURS PRN
Status: DISCONTINUED | OUTPATIENT
Start: 2025-06-07 | End: 2025-06-08 | Stop reason: HOSPADM

## 2025-06-07 RX ORDER — IOPAMIDOL 755 MG/ML
82 INJECTION, SOLUTION INTRAVASCULAR ONCE
Status: COMPLETED | OUTPATIENT
Start: 2025-06-07 | End: 2025-06-07

## 2025-06-07 RX ORDER — HYDROXYZINE HYDROCHLORIDE 50 MG/1
50 TABLET, FILM COATED ORAL EVERY 6 HOURS PRN
Status: DISCONTINUED | OUTPATIENT
Start: 2025-06-07 | End: 2025-06-08 | Stop reason: HOSPADM

## 2025-06-07 RX ORDER — NALOXONE HYDROCHLORIDE 0.4 MG/ML
0.2 INJECTION, SOLUTION INTRAMUSCULAR; INTRAVENOUS; SUBCUTANEOUS
Status: DISCONTINUED | OUTPATIENT
Start: 2025-06-07 | End: 2025-06-08 | Stop reason: HOSPADM

## 2025-06-07 RX ORDER — POLYETHYLENE GLYCOL 3350 17 G/17G
17 POWDER, FOR SOLUTION ORAL 2 TIMES DAILY PRN
Status: DISCONTINUED | OUTPATIENT
Start: 2025-06-07 | End: 2025-06-08 | Stop reason: HOSPADM

## 2025-06-07 RX ORDER — PROCHLORPERAZINE MALEATE 10 MG
10 TABLET ORAL EVERY 6 HOURS PRN
Status: DISCONTINUED | OUTPATIENT
Start: 2025-06-07 | End: 2025-06-08 | Stop reason: HOSPADM

## 2025-06-07 RX ORDER — ONDANSETRON 2 MG/ML
4 INJECTION INTRAMUSCULAR; INTRAVENOUS ONCE
Status: COMPLETED | OUTPATIENT
Start: 2025-06-07 | End: 2025-06-07

## 2025-06-07 RX ORDER — ACETAMINOPHEN 325 MG/1
650 TABLET ORAL EVERY 4 HOURS PRN
Status: DISCONTINUED | OUTPATIENT
Start: 2025-06-07 | End: 2025-06-08 | Stop reason: HOSPADM

## 2025-06-07 RX ORDER — SODIUM CHLORIDE 9 MG/ML
INJECTION, SOLUTION INTRAVENOUS ONCE
Status: COMPLETED | OUTPATIENT
Start: 2025-06-07 | End: 2025-06-07

## 2025-06-07 RX ORDER — HYDROMORPHONE HCL IN WATER/PF 6 MG/30 ML
0.4 PATIENT CONTROLLED ANALGESIA SYRINGE INTRAVENOUS
Refills: 0 | Status: DISCONTINUED | OUTPATIENT
Start: 2025-06-07 | End: 2025-06-08 | Stop reason: HOSPADM

## 2025-06-07 RX ORDER — LOPERAMIDE HYDROCHLORIDE 2 MG/1
2 TABLET ORAL 4 TIMES DAILY PRN
Qty: 14 TABLET | Refills: 0 | Status: SHIPPED | OUTPATIENT
Start: 2025-06-07 | End: 2025-06-23

## 2025-06-07 RX ORDER — NALOXONE HYDROCHLORIDE 0.4 MG/ML
0.4 INJECTION, SOLUTION INTRAMUSCULAR; INTRAVENOUS; SUBCUTANEOUS
Status: DISCONTINUED | OUTPATIENT
Start: 2025-06-07 | End: 2025-06-08 | Stop reason: HOSPADM

## 2025-06-07 RX ORDER — IBUPROFEN 600 MG/1
600 TABLET, FILM COATED ORAL EVERY 6 HOURS PRN
Status: DISCONTINUED | OUTPATIENT
Start: 2025-06-07 | End: 2025-06-08 | Stop reason: HOSPADM

## 2025-06-07 RX ORDER — OXYCODONE HYDROCHLORIDE 5 MG/1
5 TABLET ORAL EVERY 4 HOURS PRN
Refills: 0 | Status: DISCONTINUED | OUTPATIENT
Start: 2025-06-07 | End: 2025-06-08 | Stop reason: HOSPADM

## 2025-06-07 RX ORDER — ACETAMINOPHEN 650 MG/1
650 SUPPOSITORY RECTAL EVERY 4 HOURS PRN
Status: DISCONTINUED | OUTPATIENT
Start: 2025-06-07 | End: 2025-06-08 | Stop reason: HOSPADM

## 2025-06-07 RX ORDER — DICYCLOMINE HCL 20 MG
20 TABLET ORAL 4 TIMES DAILY PRN
Qty: 30 TABLET | Refills: 0 | Status: SHIPPED | OUTPATIENT
Start: 2025-06-07 | End: 2025-06-17

## 2025-06-07 RX ORDER — IBUPROFEN 200 MG
200 TABLET ORAL EVERY 4 HOURS PRN
COMMUNITY
End: 2025-06-23

## 2025-06-07 RX ORDER — ONDANSETRON 4 MG/1
4 TABLET, ORALLY DISINTEGRATING ORAL EVERY 6 HOURS PRN
Status: DISCONTINUED | OUTPATIENT
Start: 2025-06-07 | End: 2025-06-08 | Stop reason: HOSPADM

## 2025-06-07 RX ORDER — ONDANSETRON 2 MG/ML
4 INJECTION INTRAMUSCULAR; INTRAVENOUS EVERY 6 HOURS PRN
Status: DISCONTINUED | OUTPATIENT
Start: 2025-06-07 | End: 2025-06-08 | Stop reason: HOSPADM

## 2025-06-07 RX ORDER — DICYCLOMINE HYDROCHLORIDE 10 MG/1
20 CAPSULE ORAL 4 TIMES DAILY PRN
Status: DISCONTINUED | OUTPATIENT
Start: 2025-06-08 | End: 2025-06-08 | Stop reason: HOSPADM

## 2025-06-07 RX ORDER — PIPERACILLIN SODIUM, TAZOBACTAM SODIUM 4; .5 G/20ML; G/20ML
4.5 INJECTION, POWDER, LYOPHILIZED, FOR SOLUTION INTRAVENOUS ONCE
Status: DISCONTINUED | OUTPATIENT
Start: 2025-06-07 | End: 2025-06-07

## 2025-06-07 RX ORDER — HYDROMORPHONE HCL IN WATER/PF 6 MG/30 ML
0.2 PATIENT CONTROLLED ANALGESIA SYRINGE INTRAVENOUS
Refills: 0 | Status: DISCONTINUED | OUTPATIENT
Start: 2025-06-07 | End: 2025-06-08 | Stop reason: HOSPADM

## 2025-06-07 RX ORDER — AMOXICILLIN 250 MG
1 CAPSULE ORAL 2 TIMES DAILY PRN
Status: DISCONTINUED | OUTPATIENT
Start: 2025-06-07 | End: 2025-06-08 | Stop reason: HOSPADM

## 2025-06-07 RX ORDER — ONDANSETRON 4 MG/1
4 TABLET, ORALLY DISINTEGRATING ORAL EVERY 8 HOURS PRN
Qty: 20 TABLET | Refills: 0 | Status: SHIPPED | OUTPATIENT
Start: 2025-06-07 | End: 2025-06-14

## 2025-06-07 RX ADMIN — DICYCLOMINE HYDROCHLORIDE 20 MG: 20 TABLET ORAL at 16:36

## 2025-06-07 RX ADMIN — DICYCLOMINE HYDROCHLORIDE 20 MG: 20 TABLET ORAL at 22:04

## 2025-06-07 RX ADMIN — KETOROLAC TROMETHAMINE 15 MG: 15 INJECTION, SOLUTION INTRAMUSCULAR; INTRAVENOUS at 00:26

## 2025-06-07 RX ADMIN — SODIUM CHLORIDE: 0.9 INJECTION, SOLUTION INTRAVENOUS at 22:03

## 2025-06-07 RX ADMIN — ONDANSETRON 4 MG: 2 INJECTION, SOLUTION INTRAMUSCULAR; INTRAVENOUS at 16:36

## 2025-06-07 RX ADMIN — SODIUM CHLORIDE 1000 ML: 0.9 INJECTION, SOLUTION INTRAVENOUS at 18:06

## 2025-06-07 RX ADMIN — SODIUM CHLORIDE 1000 ML: 0.9 INJECTION, SOLUTION INTRAVENOUS at 16:35

## 2025-06-07 RX ADMIN — ONDANSETRON 4 MG: 2 INJECTION, SOLUTION INTRAMUSCULAR; INTRAVENOUS at 22:03

## 2025-06-07 RX ADMIN — FAMOTIDINE 20 MG: 10 INJECTION, SOLUTION INTRAVENOUS at 00:24

## 2025-06-07 RX ADMIN — IOPAMIDOL 82 ML: 755 INJECTION, SOLUTION INTRAVENOUS at 01:01

## 2025-06-07 RX ADMIN — ONDANSETRON 4 MG: 2 INJECTION, SOLUTION INTRAMUSCULAR; INTRAVENOUS at 00:22

## 2025-06-07 RX ADMIN — SODIUM CHLORIDE 1000 ML: 0.9 INJECTION, SOLUTION INTRAVENOUS at 01:21

## 2025-06-07 ASSESSMENT — ACTIVITIES OF DAILY LIVING (ADL)
ADLS_ACUITY_SCORE: 47
ADLS_ACUITY_SCORE: 41
ADLS_ACUITY_SCORE: 47
ADLS_ACUITY_SCORE: 41
ADLS_ACUITY_SCORE: 47
ADLS_ACUITY_SCORE: 41
ADLS_ACUITY_SCORE: 41
ADLS_ACUITY_SCORE: 47

## 2025-06-07 ASSESSMENT — COLUMBIA-SUICIDE SEVERITY RATING SCALE - C-SSRS
1. IN THE PAST MONTH, HAVE YOU WISHED YOU WERE DEAD OR WISHED YOU COULD GO TO SLEEP AND NOT WAKE UP?: NO
6. HAVE YOU EVER DONE ANYTHING, STARTED TO DO ANYTHING, OR PREPARED TO DO ANYTHING TO END YOUR LIFE?: NO
2. HAVE YOU ACTUALLY HAD ANY THOUGHTS OF KILLING YOURSELF IN THE PAST MONTH?: NO

## 2025-06-07 NOTE — ED PROVIDER NOTES
EMERGENCY DEPARTMENT ENCOUNTER      NAME: Alan Hutton  AGE: 20 year old female  YOB: 2005  EVALUATION DATE & TIME: No admission date for patient encounter.    ED PROVIDER: Ginna Choe MD    Chief Complaint   Patient presents with    Abdominal Pain       FINAL IMPRESSION  1. Gastroenteritis    2. Nausea and vomiting, unspecified vomiting type    3. Diarrhea, unspecified type    4. Generalized abdominal pain        MEDICAL DECISION MAKING   Alan Hutton is a 20 year old female who presents for evaluation of nausea, vomiting, diarrhea, abdominal pain, and fever.  Patient reports onset of symptoms earlier this morning.  She reports that she also did have a fever but took ibuprofen and had resolution thereafter.  She has had some sick contacts with similar symptoms.  No history of abdominal surgeries, kidney stones, or kidney infections.  Vitals on arrival notable for tachycardia but otherwise stable reassuring.    Outside records reviewed.  Patient seen for routine wellness exam on 8/6/2024.  Reviewed that note.  No recent ED or clinic visits for related complaints.    I considered a broad differential including but not limited to GERD, PUD, gastritis, pancreatitis, hepatobiliary disease, gastroenteritis, appendicitis, diverticulitis, ureterolithiasis, pyelonephritis, cystitis, sepsis/bacteremia. Discussed options for workup with the patient. We agreed on plan for labs, UA, CT abdomen/pelvis, and management of symptoms with IV analgesic/antiemetic.     ED Course as of 06/07/25 0504   Sat Jun 07, 2025   0049 Lactic Acid(!): 3.2  Lactate elevated, certainly concerning for infectious etiology given report of fever and tachycardia noted on arrival.  Will continue IV fluids and await results of CT scan   0050 CBC with platelets + differential(!)  CBC with leukocytosis and WBC of 12.0, concerning for infectious/inflammatory process, especially in light of reported fever and tachycardia noted on arrival.  No acute  anemia.   0050 Comprehensive metabolic panel(!)  CMP reassuring. No evidence of GOLDEN, acidosis, or significant electrolyte derangement. No acute elevation of bilirubin or transaminates to suggest acute hepatobiliary process.   0050 Magnesium: 1.7  Magnesium within normal limits, reassuring   0146 Lactic Acid: 1.8  Repeat lactate normalized. Reassuring.    0504 CT Abdomen Pelvis w Contrast  CT with evidence of enteritis but no appendicitis or other acute intra-abdominal process.     Workup today was generally reassuring, as above.  I rechecked the patient multiple times.  She had significant improvement in symptoms with initial interventions.  Tachycardia noted on arrival did resolve with fluids and lactate normalized.  Although I did initially order antibiotics, after CT scan revealed evidence of enteritis, I do not believe that these would necessarily be required.  I suspect symptoms are more viral related and elevated lactate more secondary to fluid losses/GI symptoms rather than systemic infectious/inflammatory process.  Out of abundance of caution, we will plan to draw blood cultures.  I did offer admission, but patient would rather go home and rest which I believe is very reasonable.  She has been able to tolerate p.o. here and feels comfortable managing her symptoms conservatively for now.  Will send with prescriptions for Imodium and Zofran  And have her follow-up closely with PCP.    At the end of the encounter, we reviewed the results, potential diagnoses, as well as return precautions and recommendations for follow up. I instructed Ms. Hutton to return to the emergency department immediately if she develops any new or worsening symptoms and provided additional verbal discharge instructions. Ms. Hutton expressed understanding and agreement with this plan of care, her questions were answered, and she was discharged in stable condition.      Additional Considerations in MDM  History:  Supplemental history from:  N/A  External Record(s) reviewed: Wellness exam 8/6/2024    Work Up:  Chart documentation includes differential diagnoses considered and any EKGs or imaging independently interpreted as specified above.   In additional to work up documented, I considered additional advanced imaging and laboratory workup but deferred after shared decision making conversation with patient/family    External Consultation(s):  Discussion of management with another provider as documented above and in ED course     Chronic Illness(es):  Care impacted by chronic illness(es): N/A    Disposition considerations: Discharge. I prescribed additional prescription strength medication(s) as charted. I considered admission, but ultimately discharged patient after shared decision making, p.o. challenge, and discussion with patient.    MIPS: Not Applicable     Sepsis/STEMI/Stroke: Lactic acid elevated due to viral illness and GI losses. At this time there are no signs of sepsis or septic shock      ED COURSE  11:27 PM I met with the patient, obtained history, performed an initial exam, and discussed options and plan for diagnostics and treatment here in the ED.   1:00 AM I rechecked the patient.   1:17 AM I went to go update the patient on the current plan for their care.  1:47 AM I went to go update the patient on the current plan for their care.  3:24 AM We discussed the plan for discharge and the patient is agreeable. Reviewed supportive cares, symptomatic treatment, outpatient follow up, and reasons to return to the Emergency Department. Patient to be discharged by ED RN.      MEDICATIONS GIVEN IN THE ED  Medications   sodium chloride 0.9% BOLUS 1,000 mL (0 mLs Intravenous Stopped 6/7/25 0027)   famotidine (PEPCID) injection 20 mg (20 mg Intravenous $Given 6/7/25 0024)   ondansetron (ZOFRAN) injection 4 mg (4 mg Intravenous $Given 6/7/25 0022)   ketorolac (TORADOL) injection 15 mg (15 mg Intravenous $Given 6/7/25 0026)   sodium chloride 0.9% BOLUS  1,000 mL (0 mLs Intravenous Stopped 6/7/25 9707)   iopamidol (ISOVUE-370) solution 82 mL (82 mLs Intravenous $Given 6/7/25 0101)       NEW PRESCRIPTIONS STARTED AT TODAY'S VISIT  Discharge Medication List as of 6/7/2025  3:32 AM        START taking these medications    Details   loperamide (IMODIUM A-D) 2 MG tablet Take 1 tablet (2 mg) by mouth 4 times daily as needed for diarrhea., Disp-14 tablet, R-0, E-Prescribe      ondansetron (ZOFRAN ODT) 4 MG ODT tab Take 1 tablet (4 mg) by mouth every 8 hours as needed for nausea., Disp-20 tablet, R-0, E-Prescribe                =================================================================    HPI:    Use of : N/A      Hsat ADÁN Hutton is a 20 year old female who presents with vomiting, diarrhea, and abdominal.    Per patient, at 4:00 AM this morning (06/06/2025) she developed diarrhea and vomiting. She also has abdominal pain and fatigue. This morning she had a fever which she took ibuprofen for. She was around other people that got sick around the same time as her.    She has had no urinary symptoms. She did not eat anything new. No daily medications were mentioned.      RELEVANT HISTORY, MEDICATIONS, & ALLERGIES   Past medical history, surgical history, family history, medications, and allergies reviewed and pertinent noted in HPI.    REVIEW OF SYSTEMS:  A complete review of systems was performed with pertinent positives and negatives noted in the HPI.     PHYSICAL EXAM:    Vitals: /66   Pulse 99   Temp 99  F (37.2  C)   Resp 18   Ht 1.524 m (5')   Wt 76.2 kg (168 lb)   LMP 05/07/2025   SpO2 98%   BMI 32.81 kg/m     General: Alert and interactive, comfortable appearing.  HENT: Atraumatic. Full AROM of neck. MMM.  Cardiovascular: Tachycardic, regular  Chest/Pulmonary: Normal work of breathing. Speaking in complete sentences. Lungs CTAB. No chest wall tenderness or deformities.  Abdomen: Soft, nondistended.  Diffuse tenderness to palpation without  guarding or rebound.  Extremities: Normal AROM of all major joints.  Skin: Warm and dry. Normal skin color.   Neuro: Speech clear. CNs grossly intact. Moves all extremities spontaneously.   Psych: Normal affect/mood, cooperative, memory appropriate.      LAB  Labs Ordered and Resulted from Time of ED Arrival to Time of ED Departure   COMPREHENSIVE METABOLIC PANEL - Abnormal       Result Value    Sodium 133 (*)     Potassium 4.0      Carbon Dioxide (CO2) 19 (*)     Anion Gap 15      Urea Nitrogen 12.8      Creatinine 0.89      GFR Estimate >90      Calcium 9.5      Chloride 99      Glucose 123 (*)     Alkaline Phosphatase 51      AST 22      ALT 45      Protein Total 8.1      Albumin 4.5      Bilirubin Total 0.5     LACTIC ACID WHOLE BLOOD WITH 1X REPEAT IN 2 HR WHEN >2 - Abnormal    Lactic Acid, Initial 3.2 (*)    ROUTINE UA WITH MICROSCOPIC REFLEX TO CULTURE - Abnormal    Color Urine Light Yellow      Appearance Urine Turbid (*)     Glucose Urine Negative      Bilirubin Urine Negative      Ketones Urine Negative      Specific Gravity Urine 1.015      Blood Urine Negative      pH Urine 5.5      Protein Albumin Urine 20 (*)     Urobilinogen Urine Normal      Nitrite Urine Negative      Leukocyte Esterase Urine 75 Sada/uL (*)     Bacteria Urine Few (*)     Budding Yeast Urine Moderate (*)     Mucus Urine Present (*)     RBC Urine 0      WBC Urine 12 (*)     Squamous Epithelials Urine 4 (*)    CBC WITH PLATELETS AND DIFFERENTIAL - Abnormal    WBC Count 12.0 (*)     RBC Count 4.66      Hemoglobin 12.5      Hematocrit 38.5      MCV 83      MCH 26.8      MCHC 32.5      RDW 12.7      Platelet Count 522 (*)     % Neutrophils 91      % Lymphocytes 4      % Monocytes 4      % Eosinophils 0      % Basophils 0      % Immature Granulocytes 1      NRBCs per 100 WBC 0      Absolute Neutrophils 10.8 (*)     Absolute Lymphocytes 0.5 (*)     Absolute Monocytes 0.5      Absolute Eosinophils 0.0      Absolute Basophils 0.0      Absolute  Immature Granulocytes 0.1      Absolute NRBCs 0.0     MAGNESIUM - Normal    Magnesium 1.7     HCG QUALITATIVE PREGNANCY - Normal    hCG Serum Qualitative Negative     LACTIC ACID WHOLE BLOOD - Normal    Lactic Acid 1.8     URINE CULTURE   BLOOD CULTURE   BLOOD CULTURE       RADIOLOGY  CT Abdomen Pelvis w Contrast   Final Result   IMPRESSION:    1.  Fluid filled, nondilated small bowel loops with mild wall thickening consistent with enteritis.   2.  Small collapsing right ovarian follicle, within physiologic limits.            I, Kenneth Velazquez, am serving as a scribe to document services personally performed by Dr. Ginna Choe based on my observation and the provider's statements to me. I, Ginna Choe MD attest that Kenneth Velazquez is acting in a scribe capacity, has observed my performance of the services and has documented them in accordance with my direction.    Ginna Choe M.D.  Emergency Medicine  [facility] EMERGENCY DEPARTMENT  [facility] [facility] [address]  [phone]  Dept: [phone]      Ginna Choe MD  06/07/25 1051

## 2025-06-07 NOTE — DISCHARGE INSTRUCTIONS
You were seen in the emergency department today for nausea, vomiting, and diarrhea.  This most likely represents a viral illness.      Over the next few days, rest and drink plenty of water and other fluids.  Fizzy liquids like ginger ale might feel better in your stomach.  Eat foods that are gentle on your stomach,like the BRAT diet (Banana, Rice, Apple Sauce, Toast).      You can take 600mg of Ibuprofen (Motrin, Advil) by mouth with food every 6-8 hours for pain (no more than 3200mg in 24hrs).      You may also take 650-1000mg of Acetaminophen (Tylenol) along with the Ibuprofen.  Take no more than 3000mg in 24hrs and write down the times you are taking both medications to ensure appropriate time in between doses.    You are being sent with a prescription for Zofran (ondansetron) for nausea, immodium for diarrhea, and an antacid medication to settle your stomach.     Please return to the Emergency Department immediately if you experience fever, worsening pain, inability to keep food/fluids down, and/or if your symptoms get worse, do not improve, or with any new concerns. Otherwise, please follow up with your primary physician within the next week for recheck and ongoing management.     Below is some information you might find useful.     Thank you for choosing [facility]. It was a pleasure taking care of you today!  -Dr. Ginna Choe

## 2025-06-07 NOTE — ED PROVIDER NOTES
Emergency Department Encounter     Evaluation Date & Time:   6/7/2025  3:58 PM    CHIEF COMPLAINT:  Nausea, Vomiting, & Diarrhea      Triage Note:Pt arrives to department ambulatory from home with friend.    Pt story: Pt reports nausea, vomiting, and diarrhea since 0400 yesterday. Pt was seen here yesterday. States diarrhea has been constant every 15 mins.    Related medications taken at home prior to arrival: ibuprofen at 0600 today.    BP (!) 80/52   Pulse (!) 135   Temp 99.4  F (37.4  C) (Oral)   Resp 22   Ht 1.524 m (5')   Wt 76.1 kg (167 lb 11.2 oz)   LMP 05/07/2025   SpO2 100%   BMI 32.75 kg/m       Pt oriented to department, standard department flow, and updated on immediate plan of care. Questions answered.     ED COURSE & MEDICAL DECISION MAKING:     Pt here with friend for ongoing n/v/d that started yesterday. She was seen in our ED yesterday for same.  Blood cultures x 2 from overnight neg for growth after 12 hours.  CT showing enteritis and pt was improved, ultimately decided to go home.  Unfortunately, she did not  Rx for zofran and only taking ibuprofen at home.  No bleeding, benign abdomen, afebrile.  Pt reports parents now sick from her, but no other previous sick contacts, no antibx recently and no hx of c-diff.  History most consistent with a viral gastroenteritis.  Initial BP low and tachycardic, so repeating labs, including lactate, hydrating and treating symptomatically.  Do not feel repeat CT abd indicated given one was just done yesterday and abdomen overall benign here.      ED Course as of 06/07/25 2219   Sat Jun 07, 2025   1617 I introduced myself to the patient, obtained patient history, performed a physical exam, and discussed plan for ED workup including potential diagnostic laboratory/imaging studies and interventions.    1640 Lactate mildly up at 2.3, likely secondary from vomiting.  CBC unremarkable.     1715 Rest of labs thus far reassuring.   1732 BP improved.      1733 I went to recheck the patient and update on plan of care.  Pt feeling already better, tolerated some PO fluids. Will continue IVF boluses, then recheck lactate.   2126 Repeat lactate wnl.   2146 I went to recheck the patient and update on plan of care. Planning on admission after discussion. She's overall improving, but still getting lightheaded when she gets up and some nausea.  She is tolerating PO. Will obs overnight for continued IVF, symptomatic cares.   2152 Spoke to hospitalist, Dr. Verdin.   2218 Pt now asking to be discharged, stating she feels well and doesn't want to stay in hospital. Given she's young/healthy, reassuring labs again and tolerating PO, this isn't unreasonable. She is still tachy, so I did encourage hospitalzation, but pt declines. Encouraged her to start her previous zofran Rx, OTC imodium. Rx for bentyl prn as well.          Medical Decision Making  I reviewed the EMR: Outpatient Record: ED visit yesterday  Admit.    MIPS (CTPE, Dental pain, [place], Sinusitis, Asthma/COPD, Head Trauma): Not Applicable    SEPSIS: None          MEDICATIONS GIVEN IN THE EMERGENCY DEPARTMENT:  Medications   senna-docusate (SENOKOT-S/PERICOLACE) 8.6-50 MG per tablet 1 tablet (has no administration in time range)     Or   senna-docusate (SENOKOT-S/PERICOLACE) 8.6-50 MG per tablet 2 tablet (has no administration in time range)   ondansetron (ZOFRAN ODT) ODT tab 4 mg (has no administration in time range)     Or   ondansetron (ZOFRAN) injection 4 mg (has no administration in time range)   prochlorperazine (COMPAZINE) injection 10 mg (has no administration in time range)     Or   prochlorperazine (COMPAZINE) tablet 10 mg (has no administration in time range)   acetaminophen (TYLENOL) tablet 650 mg (has no administration in time range)     Or   acetaminophen (TYLENOL) Suppository 650 mg (has no administration in time range)   ibuprofen (ADVIL/MOTRIN) tablet 600 mg (has no administration in time range)    oxyCODONE IR (ROXICODONE) half-tab 2.5 mg (has no administration in time range)   oxyCODONE (ROXICODONE) tablet 5 mg (has no administration in time range)   HYDROmorphone (DILAUDID) injection 0.4 mg (has no administration in time range)   HYDROmorphone (DILAUDID) injection 0.2 mg (has no administration in time range)   melatonin tablet 5 mg (has no administration in time range)   polyethylene glycol (MIRALAX) Packet 17 g (has no administration in time range)   sodium chloride 0.9% BOLUS 1,000 mL (has no administration in time range)   dicyclomine (BENTYL) capsule 20 mg (has no administration in time range)   hydrOXYzine HCl (ATARAX) tablet 25 mg (has no administration in time range)     Or   hydrOXYzine HCl (ATARAX) tablet 50 mg (has no administration in time range)   naloxone (NARCAN) injection 0.2 mg (has no administration in time range)     Or   naloxone (NARCAN) injection 0.4 mg (has no administration in time range)     Or   naloxone (NARCAN) injection 0.2 mg (has no administration in time range)     Or   naloxone (NARCAN) injection 0.4 mg (has no administration in time range)   sodium chloride 0.9% BOLUS 1,000 mL (0 mLs Intravenous Stopped 6/7/25 1805)   ondansetron (ZOFRAN) injection 4 mg (4 mg Intravenous $Given 6/7/25 1636)   dicyclomine (BENTYL) tablet 20 mg (20 mg Oral $Given 6/7/25 1636)   sodium chloride 0.9% BOLUS 1,000 mL (0 mLs Intravenous Stopped 6/7/25 2015)   sodium chloride 0.9 % infusion ( Intravenous $New Bag 6/7/25 2203)   ondansetron (ZOFRAN) injection 4 mg (4 mg Intravenous $Given 6/7/25 2203)   dicyclomine (BENTYL) tablet 20 mg (20 mg Oral $Given 6/7/25 2204)       NEW PRESCRIPTIONS STARTED AT TODAY'S ED VISIT:  New Prescriptions    DICYCLOMINE (BENTYL) 20 MG TABLET    Take 1 tablet (20 mg) by mouth 4 times daily as needed (abdominal pain).       HPI     Hsat ADÁN Hutton is a 20 year old female with a pertinent history of hematuria who presents to this ED via walk-in for evaluation of  nausea/vomiting and diarrhea.    Patient was evaluated at this ER last night for diarrhea that onset at 4:00AM on 6/6. At 4:15 AM, patient began having diarrhea and vomiting at the same time. She took ibuprofen for her fever and drank electrolytes without relief. She was not able to  her nausea medicine because she was too dizzy to drive. Her diarrhea has increased in frequency and volume. Also reports epigastric abdominal pain and fatigue. Her parents are ill with similar symptoms. Denies history of C. Difficile infection. Denies recent antibiotic courses.    Reviewed 6/7/25 visit to [facility] for nausea/vomiting and diarrhea. Febrile and tachy on arrival. Lactic 3.2. CBC with WBC of 12. CMP reassuring. Mag 1.7. Lactic improved to 1.8 with fluids. CT with evidence of enteritis. Admission offered, patient declined. Tolerated PO. Blood cultures drawn. Discharged with Imodium and Zofran.    REVIEW OF SYSTEMS:  See HPI      Medical History     Past Medical History:   Diagnosis Date    Hematuria 04/24/2017    Other acne 01/11/2018    Other viral warts 06/07/2025    Right knee injury 01/11/2018       Past Surgical History:   Procedure Laterality Date    EYE SURGERY Left     In [facility].  globe repair, age 4       Family History   Problem Relation Age of Onset    Asthma Mother        Social History     Tobacco Use    Smoking status: Passive Smoke Exposure - Never Smoker    Smokeless tobacco: Never    Tobacco comments:     Dad smokes outside       clindamycin (CLEOCIN-T) 1 % external gel  ibuprofen (ADVIL/MOTRIN) 200 MG tablet  loperamide (IMODIUM A-D) 2 MG tablet  ondansetron (ZOFRAN ODT) 4 MG ODT tab  salicylic acid (COMPOUND W MAX STRENGTH) 17 % external gel  tretinoin (RETIN-A) 0.01 % external gel  dicyclomine (BENTYL) 20 MG tablet        Physical Exam     Vitals:  BP 98/57   Pulse 112   Temp 99.4  F (37.4  C) (Oral)   Resp 28   Ht 1.524 m (5')   Wt 76.1 kg (167 lb 11.2 oz)   LMP 05/07/2025   SpO2 97%    BMI 32.75 kg/m      PHYSICAL EXAM:   Physical Exam  Vitals and nursing note reviewed.   Constitutional:       General: She is not in acute distress.     Appearance: Normal appearance.   HENT:      Head: Normocephalic and atraumatic.      Nose: Nose normal.      Mouth/Throat:      Mouth: Mucous membranes are moist.   Eyes:      Pupils: Pupils are equal, round, and reactive to light.   Cardiovascular:      Rate and Rhythm: Regular rhythm. Tachycardia present.      Pulses: Normal pulses.           Radial pulses are 2+ on the right side and 2+ on the left side.        Dorsalis pedis pulses are 2+ on the right side and 2+ on the left side.   Pulmonary:      Effort: Tachypnea present. No respiratory distress.      Breath sounds: Normal breath sounds.   Abdominal:      Palpations: Abdomen is soft.      Tenderness: There is no abdominal tenderness.   Musculoskeletal:      Cervical back: Full passive range of motion without pain and neck supple.      Comments: No calf tenderness or swelling b/l   Skin:     General: Skin is warm.      Findings: No rash.   Neurological:      General: No focal deficit present.      Mental Status: She is alert. Mental status is at baseline.      Comments: Fluent speech, no acute lateralizing deficits   Psychiatric:         Mood and Affect: Mood is anxious.         Behavior: Behavior normal.         Results     LAB:  All pertinent labs reviewed and interpreted  Labs Ordered and Resulted from Time of ED Arrival to Time of ED Departure   BASIC METABOLIC PANEL - Abnormal       Result Value    Sodium 134 (*)     Potassium 3.8      Chloride 105      Carbon Dioxide (CO2) 16 (*)     Anion Gap 13      Urea Nitrogen 11.1      Creatinine 0.91      GFR Estimate >90      Calcium 8.8      Glucose 113 (*)    LACTIC ACID WHOLE BLOOD WITH 1X REPEAT IN 2 HR WHEN >2 - Abnormal    Lactic Acid, Initial 2.3 (*)    CBC WITH PLATELETS AND DIFFERENTIAL - Abnormal    WBC Count 5.4      RBC Count 4.31      Hemoglobin 11.7       Hematocrit 35.8      MCV 83      MCH 27.1      MCHC 32.7      RDW 12.8      Platelet Count 407      % Neutrophils 81      % Lymphocytes 13      % Monocytes 6      % Eosinophils 0      % Basophils 0      % Immature Granulocytes 1      NRBCs per 100 WBC 0      Absolute Neutrophils 4.4      Absolute Lymphocytes 0.7 (*)     Absolute Monocytes 0.3      Absolute Eosinophils 0.0      Absolute Basophils 0.0      Absolute Immature Granulocytes 0.0      Absolute NRBCs 0.0     HEPATIC FUNCTION PANEL - Normal    Protein Total 7.3      Albumin 3.8      Bilirubin Total 0.3      Alkaline Phosphatase 42      AST 24      ALT 35      Bilirubin Direct 0.11     LIPASE - Normal    Lipase 16     MAGNESIUM - Normal    Magnesium 1.7     LACTIC ACID WHOLE BLOOD - Normal    Lactic Acid 1.1     ENTERIC BACTERIA AND VIRUS PANEL BY PCR   C. DIFFICILE TOXIN B PCR WITH REFLEX TO C. DIFFICILE EIA       RADIOLOGY:  No orders to display                ECG:  NA    PROCEDURES:  Procedures:  NA      FINAL IMPRESSION:    ICD-10-CM    1. Vomiting and diarrhea  R11.10     R19.7       2. Lightheadedness  R42       3. Tachycardia  R00.0           0 minutes of critical care time      I, Anastacia Carias, am serving as a scribe to document services personally performed by Dr. Enrique Horton, based on my observations and the provider's statements to me. I, Enrique Horton, DO attest that Anastacia Carias is acting in a scribe capacity, has observed my performance of the services and has documented them in accordance with my direction.      Enrique Horton DO  Emergency Medicine  [facility] EMERGENCY DEPARTMENT  6/7/2025  4:08 PM          Enrique Horton MD  06/07/25 [id number]

## 2025-06-07 NOTE — ED NOTES
2 sets of blood cx drawn and sent. Pt given water and popsicle for po challenge. Pt given warm blanket.

## 2025-06-07 NOTE — ED TRIAGE NOTES
Pt arrives to department ambulatory from home with friend.    Pt story: Pt reports nausea, vomiting, and diarrhea since 0400 yesterday. Pt was seen here yesterday. States diarrhea has been constant every 15 mins.    Related medications taken at home prior to arrival: ibuprofen at 0600 today.    BP (!) 80/52   Pulse (!) 135   Temp 99.4  F (37.4  C) (Oral)   Resp 22   Ht 1.524 m (5')   Wt 76.1 kg (167 lb 11.2 oz)   LMP 05/07/2025   SpO2 100%   BMI 32.75 kg/m       Pt oriented to department, standard department flow, and updated on immediate plan of care. Questions answered.

## 2025-06-07 NOTE — ED NOTES
PO challenge passed. Denies emetic activity through day time. Last diarrheal episode approximately 1530.

## 2025-06-07 NOTE — PHARMACY-ADMISSION MEDICATION HISTORY
"Pharmacist Admission Medication History    Admission medication history is complete. The information provided in this note is only as accurate as the sources available at the time of the update.    Information Source(s): Patient, Hospital records, and CareEverywhere/SureScripts via in-person    Pertinent Information: patient reports she hasn't taken her topical medications \"in a minute\" and \"needs them refilled\", but requested to leave them on her PTA list.     Changes made to PTA medication list:  Added: ibuprofen PRN (recently used today)  Deleted: None  Changed: None    Allergies reviewed with patient and updates made in EHR: yes    Medication History Completed By: Nicollette McMann, RPH 6/7/2025 5:52 PM    PTA Med List   Medication Sig Note Last Dose/Taking    ibuprofen (ADVIL/MOTRIN) 200 MG tablet Take 200 mg by mouth every 4 hours as needed for pain.  6/7/2025 Morning    clindamycin (CLEOCIN-T) 1 % external gel Apply topically 2 times daily  More than a month    loperamide (IMODIUM A-D) 2 MG tablet Take 1 tablet (2 mg) by mouth 4 times daily as needed for diarrhea. 6/7/2025: Patient did not pickup or start yet. Taking As Needed    ondansetron (ZOFRAN ODT) 4 MG ODT tab Take 1 tablet (4 mg) by mouth every 8 hours as needed for nausea. 6/7/2025: Patient did not pickup or start yet.   Taking As Needed    salicylic acid (COMPOUND W MAX STRENGTH) 17 % external gel Apply topically daily  More than a month    tretinoin (RETIN-A) 0.01 % external gel Apply topically at bedtime  More than a month     "

## 2025-06-07 NOTE — ED TRIAGE NOTES
Patient here for diffuse abdominal pain and diarrhea all day she vomited intermittently as well.

## 2025-06-08 LAB — BACTERIA UR CULT: NO GROWTH

## 2025-06-08 NOTE — SIGNIFICANT EVENT
Significant Event Note    Time of event: 10:17 PM June 7, 2025    Description of event:  Spoke with ED Provider Dr. Horton regarding plans for overnight observation admission of patient for diagnosis of enteritis, meeting criteria for SIRS and sepsis. Initial admission ordered placed by myself, however shortly after, Spoke with Dr. Horton again and patient has decided to discharge from ED to manage symptoms at home.     Plan:  ED provider to discharge patient from ED  Discussed with: ED provider , Dr. Horton  _______________________________  Enoch Verdin MD

## 2025-06-08 NOTE — DISCHARGE INSTRUCTIONS
Take over the counter imodium for diarrhea. Take previous zofran for nausea. Take dicyclomine as directed/needed for abdominal pain/cramping.  Hydrate with electrolyte fluids. Return for worsening changes despite medications.

## 2025-06-09 ENCOUNTER — PATIENT OUTREACH (OUTPATIENT)
Dept: CARE COORDINATION | Facility: CLINIC | Age: 20
End: 2025-06-09
Payer: COMMERCIAL

## 2025-06-09 LAB
ALBUMIN SERPL BCG-MCNC: 4.4 G/DL (ref 3.5–5.2)
ALP SERPL-CCNC: 60 U/L (ref 40–150)
ALT SERPL W P-5'-P-CCNC: 41 U/L (ref 0–50)
ANION GAP SERPL CALCULATED.3IONS-SCNC: 16 MMOL/L (ref 7–15)
AST SERPL W P-5'-P-CCNC: 30 U/L (ref 0–45)
BILIRUB SERPL-MCNC: 0.3 MG/DL
BUN SERPL-MCNC: 15.5 MG/DL (ref 6–20)
CALCIUM SERPL-MCNC: 9.9 MG/DL (ref 8.8–10.4)
CHLORIDE SERPL-SCNC: 98 MMOL/L (ref 98–107)
CREAT SERPL-MCNC: 1.06 MG/DL (ref 0.51–0.95)
EGFRCR SERPLBLD CKD-EPI 2021: 76 ML/MIN/1.73M2
ERYTHROCYTE [DISTWIDTH] IN BLOOD BY AUTOMATED COUNT: 12.6 % (ref 10–15)
GLUCOSE SERPL-MCNC: 103 MG/DL (ref 70–99)
HCO3 SERPL-SCNC: 20 MMOL/L (ref 22–29)
HCT VFR BLD AUTO: 41.6 % (ref 35–47)
HGB BLD-MCNC: 14 G/DL (ref 11.7–15.7)
HOLD SPECIMEN: NORMAL
HOLD SPECIMEN: NORMAL
MAGNESIUM SERPL-MCNC: 2.3 MG/DL (ref 1.7–2.3)
MCH RBC QN AUTO: 27.2 PG (ref 26.5–33)
MCHC RBC AUTO-ENTMCNC: 33.7 G/DL (ref 31.5–36.5)
MCV RBC AUTO: 81 FL (ref 78–100)
PLATELET # BLD AUTO: 564 10E3/UL (ref 150–450)
POTASSIUM SERPL-SCNC: 3.8 MMOL/L (ref 3.4–5.3)
PROT SERPL-MCNC: 9 G/DL (ref 6.4–8.3)
RBC # BLD AUTO: 5.15 10E6/UL (ref 3.8–5.2)
SODIUM SERPL-SCNC: 134 MMOL/L (ref 135–145)
WBC # BLD AUTO: 5 10E3/UL (ref 4–11)

## 2025-06-09 PROCEDURE — 36415 COLL VENOUS BLD VENIPUNCTURE: CPT | Performed by: EMERGENCY MEDICINE

## 2025-06-09 PROCEDURE — 85027 COMPLETE CBC AUTOMATED: CPT | Performed by: EMERGENCY MEDICINE

## 2025-06-09 PROCEDURE — 96375 TX/PRO/DX INJ NEW DRUG ADDON: CPT

## 2025-06-09 PROCEDURE — 96361 HYDRATE IV INFUSION ADD-ON: CPT

## 2025-06-09 PROCEDURE — 83735 ASSAY OF MAGNESIUM: CPT | Performed by: EMERGENCY MEDICINE

## 2025-06-09 PROCEDURE — 82310 ASSAY OF CALCIUM: CPT | Performed by: EMERGENCY MEDICINE

## 2025-06-09 PROCEDURE — 250N000011 HC RX IP 250 OP 636: Mod: JZ | Performed by: EMERGENCY MEDICINE

## 2025-06-09 PROCEDURE — 258N000003 HC RX IP 258 OP 636: Performed by: EMERGENCY MEDICINE

## 2025-06-09 PROCEDURE — 99285 EMERGENCY DEPT VISIT HI MDM: CPT | Mod: 25

## 2025-06-09 PROCEDURE — 96374 THER/PROPH/DIAG INJ IV PUSH: CPT

## 2025-06-09 RX ORDER — KETOROLAC TROMETHAMINE 15 MG/ML
15 INJECTION, SOLUTION INTRAMUSCULAR; INTRAVENOUS ONCE
Status: COMPLETED | OUTPATIENT
Start: 2025-06-09 | End: 2025-06-09

## 2025-06-09 RX ORDER — ONDANSETRON 2 MG/ML
4 INJECTION INTRAMUSCULAR; INTRAVENOUS ONCE
Status: COMPLETED | OUTPATIENT
Start: 2025-06-09 | End: 2025-06-09

## 2025-06-09 RX ADMIN — SODIUM CHLORIDE, SODIUM LACTATE, POTASSIUM CHLORIDE, AND CALCIUM CHLORIDE 1000 ML: .6; .31; .03; .02 INJECTION, SOLUTION INTRAVENOUS at 22:27

## 2025-06-09 RX ADMIN — KETOROLAC TROMETHAMINE 15 MG: 15 INJECTION, SOLUTION INTRAMUSCULAR; INTRAVENOUS at 22:21

## 2025-06-09 RX ADMIN — ONDANSETRON 4 MG: 2 INJECTION, SOLUTION INTRAMUSCULAR; INTRAVENOUS at 22:22

## 2025-06-09 RX ADMIN — FAMOTIDINE 20 MG: 10 INJECTION, SOLUTION INTRAVENOUS at 22:25

## 2025-06-09 NOTE — PROGRESS NOTES
[facility]    Background: Transitional Care Management program identified per system criteria and reviewed by [facility] team for possible outreach.    Assessment: Upon chart review, [facility] Team member will not proceed with patient outreach related to this episode of Transitional Care Management program due to reason below:    Patient has active communication with a nurse, provider or care team for reason of post-hospital follow up plan.  Outreach call by [facility] team not indicated to minimize duplicative efforts.     Patient is in active communication with a Registered Nurse from [facility]. No W outreach call needed at this time. Chart review only.    Plan: Transitional Care Management episode addressed appropriately per reason noted above.      KARLIE Lemos  [phone]  [facility]     *Connected Care Resource Team does NOT follow patient ongoing. Referrals are identified based on internal discharge reports and the outreach is to ensure patient has an understanding of their discharge instructions.

## 2025-06-10 ENCOUNTER — HOSPITAL ENCOUNTER (INPATIENT)
Facility: HOSPITAL | Age: 20
End: 2025-06-10
Attending: EMERGENCY MEDICINE | Admitting: INTERNAL MEDICINE
Payer: COMMERCIAL

## 2025-06-10 DIAGNOSIS — R11.2 NAUSEA AND VOMITING, UNSPECIFIED VOMITING TYPE: ICD-10-CM

## 2025-06-10 DIAGNOSIS — K52.9 GASTROENTERITIS: ICD-10-CM

## 2025-06-10 DIAGNOSIS — R19.7 DIARRHEA, UNSPECIFIED TYPE: ICD-10-CM

## 2025-06-10 DIAGNOSIS — A02.0 SALMONELLA GASTROENTERITIS: Primary | ICD-10-CM

## 2025-06-10 LAB

## 2025-06-10 PROCEDURE — 87507 IADNA-DNA/RNA PROBE TQ 12-25: CPT | Performed by: EMERGENCY MEDICINE

## 2025-06-10 PROCEDURE — 258N000003 HC RX IP 258 OP 636: Performed by: INTERNAL MEDICINE

## 2025-06-10 PROCEDURE — G0378 HOSPITAL OBSERVATION PER HR: HCPCS

## 2025-06-10 PROCEDURE — 120N000001 HC R&B MED SURG/OB

## 2025-06-10 PROCEDURE — 250N000011 HC RX IP 250 OP 636: Performed by: INTERNAL MEDICINE

## 2025-06-10 PROCEDURE — 99232 SBSQ HOSP IP/OBS MODERATE 35: CPT | Performed by: INTERNAL MEDICINE

## 2025-06-10 PROCEDURE — 250N000013 HC RX MED GY IP 250 OP 250 PS 637: Performed by: INTERNAL MEDICINE

## 2025-06-10 PROCEDURE — 250N000011 HC RX IP 250 OP 636: Mod: JZ | Performed by: INTERNAL MEDICINE

## 2025-06-10 PROCEDURE — 99222 1ST HOSP IP/OBS MODERATE 55: CPT | Performed by: INTERNAL MEDICINE

## 2025-06-10 PROCEDURE — 96375 TX/PRO/DX INJ NEW DRUG ADDON: CPT

## 2025-06-10 PROCEDURE — 96376 TX/PRO/DX INJ SAME DRUG ADON: CPT

## 2025-06-10 RX ORDER — NALOXONE HYDROCHLORIDE 0.4 MG/ML
0.2 INJECTION, SOLUTION INTRAMUSCULAR; INTRAVENOUS; SUBCUTANEOUS
Status: DISCONTINUED | OUTPATIENT
Start: 2025-06-10 | End: 2025-06-13 | Stop reason: HOSPADM

## 2025-06-10 RX ORDER — HYDROMORPHONE HCL IN WATER/PF 6 MG/30 ML
0.2 PATIENT CONTROLLED ANALGESIA SYRINGE INTRAVENOUS
Status: DISCONTINUED | OUTPATIENT
Start: 2025-06-10 | End: 2025-06-13 | Stop reason: HOSPADM

## 2025-06-10 RX ORDER — DEXTROSE MONOHYDRATE AND SODIUM CHLORIDE 5; .9 G/100ML; G/100ML
INJECTION, SOLUTION INTRAVENOUS CONTINUOUS
Status: DISCONTINUED | OUTPATIENT
Start: 2025-06-10 | End: 2025-06-13 | Stop reason: HOSPADM

## 2025-06-10 RX ORDER — CIPROFLOXACIN 500 MG/1
500 TABLET, FILM COATED ORAL EVERY 12 HOURS SCHEDULED
Status: DISCONTINUED | OUTPATIENT
Start: 2025-06-10 | End: 2025-06-13 | Stop reason: HOSPADM

## 2025-06-10 RX ORDER — PROCHLORPERAZINE MALEATE 10 MG
10 TABLET ORAL EVERY 6 HOURS PRN
Status: DISCONTINUED | OUTPATIENT
Start: 2025-06-10 | End: 2025-06-13 | Stop reason: HOSPADM

## 2025-06-10 RX ORDER — MORPHINE SULFATE 2 MG/ML
2 INJECTION, SOLUTION INTRAMUSCULAR; INTRAVENOUS EVERY 4 HOURS PRN
Status: DISCONTINUED | OUTPATIENT
Start: 2025-06-10 | End: 2025-06-10

## 2025-06-10 RX ORDER — ONDANSETRON 2 MG/ML
4 INJECTION INTRAMUSCULAR; INTRAVENOUS EVERY 6 HOURS PRN
Status: DISCONTINUED | OUTPATIENT
Start: 2025-06-10 | End: 2025-06-13 | Stop reason: HOSPADM

## 2025-06-10 RX ORDER — NALOXONE HYDROCHLORIDE 0.4 MG/ML
0.4 INJECTION, SOLUTION INTRAMUSCULAR; INTRAVENOUS; SUBCUTANEOUS
Status: DISCONTINUED | OUTPATIENT
Start: 2025-06-10 | End: 2025-06-13 | Stop reason: HOSPADM

## 2025-06-10 RX ORDER — ACETAMINOPHEN 325 MG/1
650 TABLET ORAL EVERY 4 HOURS PRN
Status: DISCONTINUED | OUTPATIENT
Start: 2025-06-10 | End: 2025-06-13 | Stop reason: HOSPADM

## 2025-06-10 RX ORDER — ONDANSETRON 4 MG/1
4 TABLET, ORALLY DISINTEGRATING ORAL EVERY 6 HOURS PRN
Status: DISCONTINUED | OUTPATIENT
Start: 2025-06-10 | End: 2025-06-13 | Stop reason: HOSPADM

## 2025-06-10 RX ORDER — MORPHINE SULFATE 4 MG/ML
4 INJECTION, SOLUTION INTRAMUSCULAR; INTRAVENOUS
Status: DISCONTINUED | OUTPATIENT
Start: 2025-06-10 | End: 2025-06-10

## 2025-06-10 RX ADMIN — HYDROMORPHONE HYDROCHLORIDE 0.2 MG: 0.2 INJECTION, SOLUTION INTRAMUSCULAR; INTRAVENOUS; SUBCUTANEOUS at 22:03

## 2025-06-10 RX ADMIN — CIPROFLOXACIN 500 MG: 500 TABLET ORAL at 20:26

## 2025-06-10 RX ADMIN — ACETAMINOPHEN 650 MG: 325 TABLET ORAL at 17:36

## 2025-06-10 RX ADMIN — ACETAMINOPHEN 650 MG: 325 TABLET ORAL at 22:03

## 2025-06-10 RX ADMIN — PANTOPRAZOLE SODIUM 40 MG: 40 INJECTION, POWDER, FOR SOLUTION INTRAVENOUS at 07:47

## 2025-06-10 RX ADMIN — CIPROFLOXACIN 500 MG: 500 TABLET ORAL at 07:47

## 2025-06-10 RX ADMIN — HYDROMORPHONE HYDROCHLORIDE 0.2 MG: 0.2 INJECTION, SOLUTION INTRAMUSCULAR; INTRAVENOUS; SUBCUTANEOUS at 17:36

## 2025-06-10 RX ADMIN — ONDANSETRON 4 MG: 2 INJECTION, SOLUTION INTRAMUSCULAR; INTRAVENOUS at 08:13

## 2025-06-10 RX ADMIN — DEXTROSE AND SODIUM CHLORIDE: 5; .9 INJECTION, SOLUTION INTRAVENOUS at 01:48

## 2025-06-10 RX ADMIN — DEXTROSE AND SODIUM CHLORIDE: 5; .9 INJECTION, SOLUTION INTRAVENOUS at 14:15

## 2025-06-10 RX ADMIN — MORPHINE SULFATE 4 MG: 4 INJECTION, SOLUTION INTRAMUSCULAR; INTRAVENOUS at 08:05

## 2025-06-10 ASSESSMENT — ACTIVITIES OF DAILY LIVING (ADL)
DRESSING/BATHING_DIFFICULTY: NO
ADLS_ACUITY_SCORE: 34
ADLS_ACUITY_SCORE: 32
WALKING_OR_CLIMBING_STAIRS_DIFFICULTY: NO
ADLS_ACUITY_SCORE: 34
ADLS_ACUITY_SCORE: 32
ADLS_ACUITY_SCORE: 32
ADLS_ACUITY_SCORE: 34
ADLS_ACUITY_SCORE: 36
ADLS_ACUITY_SCORE: 36
ADLS_ACUITY_SCORE: 34
DIFFICULTY_COMMUNICATING: NO
TOILETING_ISSUES: NO
ADLS_ACUITY_SCORE: 32
ADLS_ACUITY_SCORE: 34
WEAR_GLASSES_OR_BLIND: NO
DIFFICULTY_EATING/SWALLOWING: NO
FALL_HISTORY_WITHIN_LAST_SIX_MONTHS: NO
CONCENTRATING,_REMEMBERING_OR_MAKING_DECISIONS_DIFFICULTY: NO
ADLS_ACUITY_SCORE: 32
DOING_ERRANDS_INDEPENDENTLY_DIFFICULTY: NO
ADLS_ACUITY_SCORE: 41
ADLS_ACUITY_SCORE: 32
ADLS_ACUITY_SCORE: 30
ADLS_ACUITY_SCORE: 24
ADLS_ACUITY_SCORE: 32
ADLS_ACUITY_SCORE: 30
ADLS_ACUITY_SCORE: 32
ADLS_ACUITY_SCORE: 32
ADLS_ACUITY_SCORE: 41
ADLS_ACUITY_SCORE: 32
ADLS_ACUITY_SCORE: 41
CHANGE_IN_FUNCTIONAL_STATUS_SINCE_ONSET_OF_CURRENT_ILLNESS/INJURY: NO

## 2025-06-10 NOTE — PROGRESS NOTES
Patient admitted to room 15 at approximately 0330 via bed from emergency room.  Reason for Admission:   Report received from: ED nurse  Patient was accompanied by Self.  Discharge transportation provided by:  Patient ambulated/transferred:  independently. self.  Patient is alert and orientated x 3.  Outpatient Observation education provided to: (patient, family, friend)  MDRO Education done if applicable (MRSA, VRE, etc)  Safety risks were identified during admission:  none.   Yellow risk/fall band applied:  No  Home meds sent home: No  Home meds sent to pharmacy:No IF YES add 1/2 sheet laminated page reminder to chart/clipboard   Detailed Belongings: cell phone; purse; cash/credit card; Patient wearing own clothes.

## 2025-06-10 NOTE — MEDICATION SCRIBE - ADMISSION MEDICATION HISTORY
Admission medication history completed at [facility]. Please see Pharmacist Admission Medication History note from 06/07/2025.

## 2025-06-10 NOTE — PLAN OF CARE
Problem: Adult Inpatient Plan of Care  Goal: Optimal Comfort and Wellbeing  Outcome: Progressing  Intervention: Monitor Pain and Promote Comfort  Recent Flowsheet Documentation  Taken 6/10/2025 0326 by Evie Johnson RN  Pain Management Interventions: medication offered but refused     Problem: Pain Acute  Goal: Optimal Pain Control and Function  Outcome: Progressing  Intervention: Develop Pain Management Plan  Recent Flowsheet Documentation  Taken 6/10/2025 0326 by Evie Johnson RN  Pain Management Interventions: medication offered but refused  Intervention: Prevent or Manage Pain  Recent Flowsheet Documentation  Taken 6/10/2025 0333 by Evie Johnson RN  Medication Review/Management: medications reviewed     Problem: Nausea and Vomiting  Goal: Nausea and Vomiting Relief  Outcome: Progressing     Problem: Diarrhea  Goal: Effective Diarrhea Management  Outcome: Progressing  Intervention: Manage Diarrhea  Recent Flowsheet Documentation  Taken 6/10/2025 0333 by Evie Johnson RN  Medication Review/Management: medications reviewed     Problem: Adult Inpatient Plan of Care  Goal: Absence of Hospital-Acquired Illness or Injury  Intervention: Prevent Skin Injury  Recent Flowsheet Documentation  Taken 6/10/2025 0333 by Evie Johnson RN  Body Position: position changed independently   Goal Outcome Evaluation:       Patient admitted for abdominal pain. Nausea/vomiting and diarrhea. Pain med offered, but patient refused. No N/V. Enteric precautions initiated. Stool positive for Salmonella Species. Provider notified. Clear liquid diet. Dextrose 5% & 0.9 NaCl infusing @ 75 ml/hr. Independent. Ambulated to the bathroom one time during the shift. AOX4. RA. VSS.

## 2025-06-10 NOTE — PROGRESS NOTES
Pt arrived to 415 from short stay around 1400. Pt alert and oriented. Independent and able to walk with IV pole. Enteric precautions for salmonella. Per pt her mother is in [address], her father is on another floor and her brother recently discharged from [facility] for the same illness. Pt settled int room. Iv fluids running per order. Clear liquid diet. Pt reports generalized abdominal pain 5/10 and requested warm pack, given.BS active. Last BM per pt was right before coming to P4, loose. Pt states she is also on her period. LS clear, RA. Denies nausea at this time. Last emesis was last Saturday per pt. Per pt she received PRN morphine for pain on other floor and became dizzy and nauseous and would prefer a different medication if needed. Text sent to Dr. Saldivar.

## 2025-06-10 NOTE — PLAN OF CARE
PRIMARY DIAGNOSIS: GASTROENTERITIS     OUTPATIENT/OBSERVATION GOALS TO BE MET BEFORE DISCHARGE  1. Orthostatic performed: N/A     2. Tolerating PO fluid and/or antibiotics (if applicable): Yes     3. Nausea/Vomiting/Diarrhea symptoms improved: No,  frequent and every 15 minutes diarrhea     4. Pain status: Improved with use of alternative comfort measures i.e.: heat     5. Return to near baseline physical activity: Yes     Discharge Planner Nurse  Safe discharge environment identified: Yes  Barriers to discharge: Yes       Entered by: Almas Huizar RN 06/10/2025 1:00 PM     Please review provider order for any additional goals.   Nurse to notify provider when observation goals have been met and patient is ready for discharge.

## 2025-06-10 NOTE — ED NOTES
[facility] ED Handoff Report    ED Chief Complaint: Abd pain, N/V/D    ED Diagnosis:  (K52.9) Gastroenteritis  Comment:   Plan:     (R11.2) Nausea and vomiting, unspecified vomiting type  Comment:   Plan:     (R19.7) Diarrhea, unspecified type  Comment:   Plan: Stool sample pending        PMH:    Past Medical History:   Diagnosis Date    Hematuria 04/24/2017    Other acne 01/11/2018    Other viral warts 06/07/2025    Created by Conversion         Replacement Utility updated for latest IMO load      Right knee injury 01/11/2018        Code Status:  Full Code     Falls Risk: No Band: Applied    Current Living Situation/Residence: lives in a house     Elimination Status: Continent: Yes     Activity Level: Independent    Patients Preferred Language:  English     Needed: No    Vital Signs:  /67   Pulse 83   Temp 98.8  F (37.1  C) (Oral)   Resp 20   Ht 1.524 m (5')   Wt 72.6 kg (160 lb)   LMP 05/07/2025   SpO2 98%   BMI 31.25 kg/m       Cardiac Rhythm: NA    Pain Score: 4/10    Is the Patient Confused:  No    Last Food or Drink: 06/10/25 water in ED    Focused Assessment:  abd pain, N/V/D that started on 6/7 was seen in ED and wanted to go home. Returned to ED later that same day, and again decided to go home after being offered admission. Return back to ED today for continued symptoms. Unable to tolerate oral intake and stooling frequently.   Reports upper and left sided abd pain 4/10 now. Currently no nausea or vomiting. Up several times to the BR for BM. Stool sample pending. IVF infusing.     Tests Performed: Done: Labs    Treatments Provided:  IV meds     Family Dynamics/Concerns: No    Family Updated On Visitor Policy: No    Plan of Care Communicated to Family: No    Who Was Updated about Plan of Care: Pt updating family independently     Belongings Checklist Done and Signed by Patient: No    Medications sent with patient: NA    Covid: asymptomatic , NA    Additional Information:      Irene Collado RN 6/10/2025 3:06 AM

## 2025-06-10 NOTE — PLAN OF CARE
PRIMARY DIAGNOSIS: ACUTE PAIN  OUTPATIENT/OBSERVATION GOALS TO BE MET BEFORE DISCHARGE:  1. Pain Status: Pain med offered, but patient refused.    2. Return to near baseline physical activity: No    3. Cleared for discharge by consultants (if involved): No    Discharge Planner Nurse   Safe discharge environment identified: No  Barriers to discharge: Yes       Entered by: Evie Johnson RN 06/10/2025 4:11 AM     Please review provider order for any additional goals.   Nurse to notify provider when observation goals have been met and patient is ready for discharge.Goal Outcome Evaluation:

## 2025-06-10 NOTE — PLAN OF CARE
Goal Outcome Evaluation:      Plan of Care Reviewed With: patient    Overall Patient Progress: no changeOverall Patient Progress: no change    Assumed cares: [id number]  Vitals: VSS on RA  Pain: Pt reports abdominal pain, PRN morphine and heat provided to pt  Neuro: A&Ox4  Cardiac: WDL  Respiratory: WDL  GI/: Abdominal pain; Nausea- PRN zofran given; frequent BMs Q15 minutes  Skin: No new skin changes  IV/Drains: L PIV- running D5NaCl at 75mL/hr  Activity: Independent, uses bedside commode  Behavior: Pt is calm and cooperative    Plan of Care: Follow patient plan of care. Pt transferred to [facility] at 1345    Transferred to [facility] at 1:45 PM   Reason for transfer: Inpatient, needs bathroom  Report given to: Emily SALGUERO  Family Notified: Pt notified family  Detailed list of belongings sent: sweatshirt, cell phone, purse, pants, sandals, lip balm.   Comments: N/A

## 2025-06-10 NOTE — PLAN OF CARE
PRIMARY DIAGNOSIS: GASTROENTERITIS    OUTPATIENT/OBSERVATION GOALS TO BE MET BEFORE DISCHARGE  1. Orthostatic performed: N/A    2. Tolerating PO fluid and/or antibiotics (if applicable): Yes    3. Nausea/Vomiting/Diarrhea symptoms improved: No,  frequent and every 15 minutes diarrhea    4. Pain status: Improved with use of alternative comfort measures i.e.: heat    5. Return to near baseline physical activity: Yes    Discharge Planner Nurse   Safe discharge environment identified: Yes  Barriers to discharge: Yes       Entered by: Almas Huizar RN 06/10/2025 9:12 AM     Please review provider order for any additional goals.   Nurse to notify provider when observation goals have been met and patient is ready for discharge.

## 2025-06-10 NOTE — ED TRIAGE NOTES
Pt arrives to triage for nausea, vomiting, and diarrhea. She reports being here the last couple days for this issue.

## 2025-06-10 NOTE — ED PROVIDER NOTES
EMERGENCY DEPARTMENT ENCOUNTER      NAME: Alan Hutton  AGE: 20 year old female  YOB: 2005  EVALUATION DATE & TIME: No admission date for patient encounter.    ED PROVIDER: Ginna Choe MD    Chief Complaint   Patient presents with    Nausea, Vomiting, & Diarrhea       FINAL IMPRESSION  1. Gastroenteritis    2. Nausea and vomiting, unspecified vomiting type    3. Diarrhea, unspecified type        MEDICAL DECISION MAKING   Alan Hutton is a 20 year old female who presents for evaluation of nausea, vomiting, and diarrhea that began 6/7/2025.  She was seen here in the ED at that time actually by myself and CT showed enteritis.  She was offered admission but elected to go home with a prescription for Zofran, which she did not .  Her symptoms were ongoing so she was seen here again later the same day and again had a reassuring workup outside of elevated lactate that improved with fluids.  Initially, plan was for admission but ultimately, she elected to go home.  She was discharged with a prescription for Bentyl and Imodium.  Today, she presents again with complaints of ongoing symptoms.  She reports that she has been having to go to the bathroom every 10 minutes and also cannot keep any fluids down orally.  She has not had any fevers or blood in the stool or vomit.  She also denies urinary symptoms.  Patient states that she now feels like she might need actually to stay in the hospital.    Vitals on arrival notable for tachycardia but otherwise stable and reassuring.  I considered a broad differential diagnosis including, but not limited to viral gastroenteritis, gastritis, food intolerance, IBS, electrolyte derangement, acute kidney injury. Abdominal exam is benign without focal tenderness or peritoneal signs to suggest acute appendicitis, pancreatitis, diverticulitis, obstruction, perforated viscus, or other acute surgical/infectious process that I believe would require imaging. Given the lack of blood in  the stools or fever, seems less likely bacterial infection or IBD. Overall, symptoms do seem most consistent with viral gastroenteritis. Discussed options for workup and management with patient. We have agreed on plan to check basic labs and manage symptoms with IVF and IV analgesic/antiemetic.  Will hold on repeat CT scan, as I suspect her symptoms are related to ongoing gastroenteritis.    ED Course as of 06/10/25 0518   Mon Jun 09, 2025 2304 Comprehensive metabolic panel(!)  CMP with evidence of acute kidney injury, creatinine 1.06, increased from baseline of less than 1.  I suspect this is related to GI losses.  Also with metabolic acidosis and anion gap of 16, again suspect related to GI symptoms.  No other electrolyte derangement or hepatobiliary disease.   2305 CBC (+ platelets, no diff)(!)  CBC reassuring. No evidence of leukocytosis to suggest systemic infectious/inflammatory process. No acute anemia. PLTs wnl.   2305 Magnesium: 2.3  Within normal limits, reassuring     I rechecked the patient multiple times reviewed results.  She had improvement in symptoms after initial interventions.  Given this is her third ED visit for the same complaints, I did recommend admission and she agreed and was actually hoping to stay in the hospital today.  I discussed the case with hospitalist who agreed to facilitate admission.    Additional Considerations in MDM  History:  Supplemental history from: None  External Record(s) reviewed: PCP 8/6/2024, ED 6/7/2025    Work Up:  Chart documentation includes differential diagnoses considered and any EKGs or imaging independently interpreted as specified above.   In additional to work up documented, I considered additional advanced imaging and laboratory workup but deferred after shared decision making conversation with patient/family    External Consultation(s):  Discussion of management with another provider as documented above and in ED course     Chronic Illness(es):  Care  impacted by chronic illness(es): None    Disposition considerations: Admit.    MIPS: Not Applicable     Sepsis/STEMI/Stroke: None      ED COURSE  9:18 PM I introduced myself to the patient, obtained patient history, performed a physical exam, and discussed plan for ED workup including potential diagnostic laboratory/imaging studies and interventions.  10:00 PM I rechecked the patient  11:08 PM I rechecked the patient  12:07 AM I spoke with the hospitalist, Dr. Delarosa. We discussed the patient's case and they agree to admit the patient.       MEDICATIONS GIVEN IN THE ED  lactated ringers BOLUS 1,000 mL (0 mLs Intravenous Stopped 6/10/25 0038)   ondansetron (ZOFRAN) injection 4 mg (4 mg Intravenous $Given 6/9/25 2222)   famotidine (PEPCID) injection 20 mg (20 mg Intravenous $Given 6/9/25 2225)   ketorolac (TORADOL) injection 15 mg (15 mg Intravenous $Given 6/9/25 2221)       NEW PRESCRIPTIONS STARTED AT TODAY'S VISIT  Current Discharge Medication List             =================================================================    HPI:    Use of : N/A     Hsat ADÁN Hutton is a 20 year old female who presents for evaluation of vomiting and diarrhea.    Per chart review, patient had two ED visits on 6/7/25 for evaluation of vomiting and diarrhea. At first visit lactate was elevated at 3.2 but came down to 1.8 with IV fluids. WBC 12.0. CT Abdomen with evidence of enteritis. Suspected viral cause of patient's illness. Had improvement of symptoms with medications in the ED, discharged with prescriptions for Zofran and Imodium. Patient returned to the ED later in the day for ongoing symptoms. Lactate gain elevated, this time at 2.3, but improved to normal limits with fluids. Had improvement with nausea medications, discharged to [facility] with prescription for Bentyl.    The patient is returning to the ED with ongoing vomiting and diarrhea. She now reports she has been having episodes of diarrhea every 5 minutes. She is  struggling to keep anything down at home. She had a fever this morning that has since gone down. With the vomiting and diarrhea she endorses generalized abdominal discomfort. She has not had any bloody stools or hematemesis. She reports her parents and brother have recently been ill with similar symptoms and her parents are currently admitted to the hospital. She has not had any urinary symptoms.      RELEVANT HISTORY, MEDICATIONS, & ALLERGIES   Past medical history, surgical history, family history, medications, and allergies reviewed and pertinent noted in HPI.    REVIEW OF SYSTEMS:  A complete review of systems was performed with pertinent positives and negatives noted in the HPI.     PHYSICAL EXAM:    Vitals: /63 (BP Location: Right arm, Patient Position: Sitting, Cuff Size: Adult Regular)   Pulse 90   Temp 99  F (37.2  C) (Oral)   Resp 18   Ht 1.524 m (5')   Wt 72.6 kg (160 lb)   LMP 05/07/2025   SpO2 98%   BMI 31.25 kg/m     General: Alert and interactive, comfortable appearing.  HENT: Atraumatic. Full AROM of neck. MMM.  Cardiovascular: Tachycardic, regular  Chest/Pulmonary: Normal work of breathing. Speaking in complete sentences. Lungs CTAB. No chest wall tenderness or deformities.  Abdomen: Soft, nondistended.  Very mild tenderness to palpation diffusely.  No guarding or rebound.  Extremities: Normal AROM of all major joints.  Skin: Warm and dry. Normal skin color.   Neuro: Speech clear. CNs grossly intact. Moves all extremities spontaneously.   Psych: Normal affect/mood, cooperative, memory appropriate.      LAB  Labs Ordered and Resulted from Time of ED Arrival to Time of ED Departure   COMPREHENSIVE METABOLIC PANEL - Abnormal       Result Value    Sodium 134 (*)     Potassium 3.8      Carbon Dioxide (CO2) 20 (*)     Anion Gap 16 (*)     Urea Nitrogen 15.5      Creatinine 1.06 (*)     GFR Estimate 76      Calcium 9.9      Chloride 98      Glucose 103 (*)     Alkaline Phosphatase 60      AST  30      ALT 41      Protein Total 9.0 (*)     Albumin 4.4      Bilirubin Total 0.3     CBC WITH PLATELETS - Abnormal    WBC Count 5.0      RBC Count 5.15      Hemoglobin 14.0      Hematocrit 41.6      MCV 81      MCH 27.2      MCHC 33.7      RDW 12.6      Platelet Count 564 (*)    MAGNESIUM - Normal    Magnesium 2.3     ENTERIC BACTERIA AND VIRUS PANEL BY PCR           I, Bryan Esparza, am serving as a scribe to document services personally performed by Dr. Ginna Choe based on my observation and the provider's statements to me. I, Ginna Choe MD attest that Bryan Esparza is acting in a scribe capacity, has observed my performance of the services and has documented them in accordance with my direction.    Ginna Choe M.D.  Emergency Medicine  [facility] EMERGENCY DEPARTMENT  [facility] [facility] [address]  [phone]  Dept: [phone]     Ginna Choe MD  06/10/25 0520

## 2025-06-10 NOTE — H&P
[facility]    History and Physical - Hospitalist Service       Date of Admission:  6/10/2025    Assessment & Plan      Alan Hutton is a 20 year old female admitted on 6/10/2025 for enteritis.     Acute gastroenteritis  Presents with generalized abdominal pain, nausea, multiple emesis and diarrhea for 2 days.  CT scan findings indicate enteritis.  - Sent stool studies and enteric pathogen  - Clear liquid diet  - IVF  - Antiemetics  - Abdominal pain control  - PPI IV    Hyponatremia: Sodium 134.  IVF as above.    Mild anion gap acidosis: IVF as above      Addendum:  Stool tested positive for Salmonella.  Patient reports having bowel movements every 10-15 minutes. Her symptoms did not improve over the past 3 days and require hospitalization. Given the severity of her illness, will start ciprofloxacin.             Diet:  Clear liquid diet  DVT Prophylaxis: Low Risk/Ambulatory with no VTE prophylaxis indicated  Gurrola Catheter: Not present  Lines: None     Cardiac Monitoring: None  Code Status:  Full code    Clinically Significant Risk Factors Present on Admission         # Hyponatremia: Lowest Na = 134 mmol/L in last 2 days, will monitor as appropriate                     # Obesity: Estimated body mass index is 31.25 kg/m  as calculated from the following:    Height as of this encounter: 1.524 m (5').    Weight as of this encounter: 72.6 kg (160 lb).              Disposition Plan     Medically Ready for Discharge: Anticipated in 1-2 days           Margaux Delarosa MD  Hospitalist Service  [facility]  Securely message with [employer] (more info)  Text page via [employer] Paging/Directory     ______________________________________________________________________    Chief Complaint   Nausea vomiting and diarrhea    History is obtained from the patient    History of Present Illness   Alan Hutton is a 20 year old female without known significant medical history presents to ER for nausea,  vomiting and diarrhea.  Patient reports that 3 days ago, she developed nausea, multiple emesis and watery diarrhea.  She has generalized abdominal pain.  She spiked low-grade fever for 2 days. She visited the ER 2 times.  CT scan of the abdomen revealed enteritis.  She was given symptom control then discharged home.  Patient reports that today, her diarrhea gets worse.  She had loose stool almost every 10 to 15 minutes.  She was not able to keep food down due to her diarrhea.  She then decided to return to the ER.      Past Medical History    Past Medical History:   Diagnosis Date    Hematuria 04/24/2017    Other acne 01/11/2018    Other viral warts 06/07/2025    Created by Conversion         Replacement Utility updated for latest IMO load      Right knee injury 01/11/2018       Past Surgical History   Past Surgical History:   Procedure Laterality Date    EYE SURGERY Left     [place].  globe repair, age 4       Prior to Admission Medications   Prior to Admission Medications   Prescriptions Last Dose Informant Patient Reported? Taking?   clindamycin (CLEOCIN-T) 1 % external gel  Self No No   Sig: Apply topically 2 times daily   dicyclomine (BENTYL) 20 MG tablet   No No   Sig: Take 1 tablet (20 mg) by mouth 4 times daily as needed (abdominal pain).   ibuprofen (ADVIL/MOTRIN) 200 MG tablet  Self Yes No   Sig: Take 200 mg by mouth every 4 hours as needed for pain.   loperamide (IMODIUM A-D) 2 MG tablet  Self No No   Sig: Take 1 tablet (2 mg) by mouth 4 times daily as needed for diarrhea.   ondansetron (ZOFRAN ODT) 4 MG ODT tab  Self No No   Sig: Take 1 tablet (4 mg) by mouth every 8 hours as needed for nausea.   salicylic acid (COMPOUND W MAX STRENGTH) 17 % external gel  Self No No   Sig: Apply topically daily   tretinoin (RETIN-A) 0.01 % external gel  Self No No   Sig: Apply topically at bedtime      Facility-Administered Medications: None        Review of Systems    The 10 point Review of Systems is negative other  than noted in the HPI or here.      Physical Exam   Vital Signs: Temp: 98.3  F (36.8  C) Temp src: Temporal BP: (!) 121/94 Pulse: 116   Resp: 20 SpO2: 99 % O2 Device: None (Room air)    Weight: 160 lbs 0 oz    General appearance: not in acute distress  HEENT: PERRL, EOMI  Lungs: Clear breath sounds in bilateral lung fields  Cardiovascular: Regular rate and rhythm, normal S1-S2  Abdomen: Soft, mild tenderness to palpation, no distension  Musculoskeletal: No joint swelling  Skin: No rash and no edema  Neurology: AAO ×3.  Cranial nerves II - XII normal.  Normal muscle strength in all four extremities.     Medical Decision Making       60 MINUTES SPENT BY ME on the date of service doing chart review, history, exam, documentation & further activities per the note.      Data     I have personally reviewed the following data over the past 24 hrs:    5.0  \   14.0   / 564 (H)     134 (L) 98 15.5 /  103 (H)   3.8 20 (L) 1.06 (H) \     ALT: 41 AST: 30 AP: 60 TBILI: 0.3   ALB: 4.4 TOT PROTEIN: 9.0 (H) LIPASE: N/A       Imaging results reviewed over the past 24 hrs:   No results found for this or any previous visit (from the past 24 hours).

## 2025-06-10 NOTE — PROGRESS NOTES
[facility]    Medicine Progress Note - Hospitalist Service    Date of Admission:  6/10/2025    Assessment & Plan     Salmonella + Acute Gastroenteritis  - Stool frequency q15 minutes, warrants abx treatment  - currently approx day 7 of acute illness  Plan:  Cont cipro, supportive care    Anion Gap Metabolic Acidosis  - due to above, diarrhea etc  Plan:  Cont IVFs, follow labs          Observation Goals: -diagnostic tests and consults completed and resulted, -vital signs normal or at patient baseline, -tolerating oral intake to maintain hydration, -adequate pain control on oral analgesics, Nurse to notify provider when observation goals have been met and patient is ready for discharge.  Diet: Clear Liquid Diet    DVT Prophylaxis: Low Risk/Ambulatory with no VTE prophylaxis indicated  Gurrola Catheter: Not present  Lines: None     Cardiac Monitoring: None  Code Status: Full Code      Clinically Significant Risk Factors Present on Admission         # Hyponatremia: Lowest Na = 134 mmol/L in last 2 days, will monitor as appropriate                     # Obesity: Estimated body mass index is 31.25 kg/m  as calculated from the following:    Height as of this encounter: 1.524 m (5').    Weight as of this encounter: 72.6 kg (160 lb).              Social Drivers of Health    Tobacco Use: Medium Risk (8/6/2024)    Patient History     Smoking Tobacco Use: Passive Smoke Exposure - Never Smoker     Smokeless Tobacco Use: Never     Passive Exposure: Yes   Physical Activity: Unknown (8/6/2024)    Exercise Vital Sign     Days of Exercise per Week: 3 days   Stress: Stress Concern Present (8/6/2024)    [employer] - Occupational Stress Questionnaire     Feeling of Stress : To some extent   Social Connections: Unknown (8/6/2024)    Social Connection and Isolation Panel [NHANES]     Frequency of Social Gatherings with Friends and Family: Once a week          Disposition Plan      Medically Ready for Discharge: Anticipated in 2-4 Days             Darrick Saldivar MD  Hospitalist Service  [facility]  Securely message with [employer] (more info)  Text page via [employer] Paging/Directory   ______________________________________________________________________    Interval History   Still having loose stools, every 15 mins  Frequent abd cramping, but also having her period which she believes is also contributing    Physical Exam   Vital Signs: Temp: 99  F (37.2  C) Temp src: Oral BP: 118/63 Pulse: 90   Resp: 18 SpO2: 98 % O2 Device: None (Room air)    Weight: 160 lbs 0 oz    Constitutional: awake, alert, cooperative, no apparent distress, and appears stated age  Hematologic / Lymphatic: no cervical lymphadenopathy  Respiratory: No increased work of breathing, good air exchange, clear to auscultation bilaterally, no crackles or wheezing  Cardiovascular: Normal apical impulse, regular rate and rhythm, normal S1 and S2, no S3 or S4, and no murmur noted  GI: Distended, + BS, mild tender without rebound  Skin: no bruising or bleeding  Neuropsychiatric: General: normal, calm, and normal eye contact    Medical Decision Making       25 MINUTES SPENT BY ME on the date of service doing chart review, history, exam, documentation & further activities per the note.      Data     I have personally reviewed the following data over the past 24 hrs:    5.0  \   14.0   / 564 (H)     134 (L) 98 15.5 /  103 (H)   3.8 20 (L) 1.06 (H) \     ALT: 41 AST: 30 AP: 60 TBILI: 0.3   ALB: 4.4 TOT PROTEIN: 9.0 (H) LIPASE: N/A       Imaging results reviewed over the past 24 hrs:   No results found for this or any previous visit (from the past 24 hours).

## 2025-06-11 LAB
ANION GAP SERPL CALCULATED.3IONS-SCNC: 9 MMOL/L (ref 7–15)
BUN SERPL-MCNC: 13 MG/DL (ref 6–20)
CALCIUM SERPL-MCNC: 8.4 MG/DL (ref 8.8–10.4)
CHLORIDE SERPL-SCNC: 105 MMOL/L (ref 98–107)
CREAT SERPL-MCNC: 0.9 MG/DL (ref 0.51–0.95)
EGFRCR SERPLBLD CKD-EPI 2021: >90 ML/MIN/1.73M2
GLUCOSE SERPL-MCNC: 125 MG/DL (ref 70–99)
HCO3 SERPL-SCNC: 22 MMOL/L (ref 22–29)
MAGNESIUM SERPL-MCNC: 2.1 MG/DL (ref 1.7–2.3)
MCV RBC AUTO: 81 FL (ref 78–100)
POTASSIUM SERPL-SCNC: 3 MMOL/L (ref 3.4–5.3)
POTASSIUM SERPL-SCNC: 3.1 MMOL/L (ref 3.4–5.3)
SODIUM SERPL-SCNC: 136 MMOL/L (ref 135–145)
WBC # BLD AUTO: 6.6 10E3/UL (ref 4–11)

## 2025-06-11 PROCEDURE — 84132 ASSAY OF SERUM POTASSIUM: CPT | Performed by: INTERNAL MEDICINE

## 2025-06-11 PROCEDURE — 99232 SBSQ HOSP IP/OBS MODERATE 35: CPT | Performed by: INTERNAL MEDICINE

## 2025-06-11 PROCEDURE — 250N000013 HC RX MED GY IP 250 OP 250 PS 637: Performed by: INTERNAL MEDICINE

## 2025-06-11 PROCEDURE — 83735 ASSAY OF MAGNESIUM: CPT | Performed by: INTERNAL MEDICINE

## 2025-06-11 PROCEDURE — 120N000001 HC R&B MED SURG/OB

## 2025-06-11 PROCEDURE — 250N000011 HC RX IP 250 OP 636: Performed by: INTERNAL MEDICINE

## 2025-06-11 PROCEDURE — 258N000003 HC RX IP 258 OP 636: Performed by: INTERNAL MEDICINE

## 2025-06-11 PROCEDURE — 36415 COLL VENOUS BLD VENIPUNCTURE: CPT | Performed by: INTERNAL MEDICINE

## 2025-06-11 PROCEDURE — 80048 BASIC METABOLIC PNL TOTAL CA: CPT | Performed by: INTERNAL MEDICINE

## 2025-06-11 PROCEDURE — 85048 AUTOMATED LEUKOCYTE COUNT: CPT | Performed by: INTERNAL MEDICINE

## 2025-06-11 RX ORDER — POTASSIUM CHLORIDE 1500 MG/1
20 TABLET, EXTENDED RELEASE ORAL ONCE
Status: COMPLETED | OUTPATIENT
Start: 2025-06-12 | End: 2025-06-12

## 2025-06-11 RX ORDER — POTASSIUM CHLORIDE 1500 MG/1
40 TABLET, EXTENDED RELEASE ORAL ONCE
Status: COMPLETED | OUTPATIENT
Start: 2025-06-11 | End: 2025-06-11

## 2025-06-11 RX ADMIN — DEXTROSE AND SODIUM CHLORIDE: 5; .9 INJECTION, SOLUTION INTRAVENOUS at 22:40

## 2025-06-11 RX ADMIN — CIPROFLOXACIN 500 MG: 500 TABLET ORAL at 09:04

## 2025-06-11 RX ADMIN — CIPROFLOXACIN 500 MG: 500 TABLET ORAL at 21:32

## 2025-06-11 RX ADMIN — POTASSIUM CHLORIDE 40 MEQ: 1500 TABLET, EXTENDED RELEASE ORAL at 22:39

## 2025-06-11 RX ADMIN — PANTOPRAZOLE SODIUM 40 MG: 40 INJECTION, POWDER, FOR SOLUTION INTRAVENOUS at 09:04

## 2025-06-11 RX ADMIN — DEXTROSE AND SODIUM CHLORIDE: 5; .9 INJECTION, SOLUTION INTRAVENOUS at 04:01

## 2025-06-11 ASSESSMENT — ACTIVITIES OF DAILY LIVING (ADL)
ADLS_ACUITY_SCORE: 32
ADLS_ACUITY_SCORE: 34
ADLS_ACUITY_SCORE: 32
ADLS_ACUITY_SCORE: 34
ADLS_ACUITY_SCORE: 32
ADLS_ACUITY_SCORE: 30
ADLS_ACUITY_SCORE: 32
ADLS_ACUITY_SCORE: 34
ADLS_ACUITY_SCORE: 32
ADLS_ACUITY_SCORE: 34
ADLS_ACUITY_SCORE: 32
ADLS_ACUITY_SCORE: 32
ADLS_ACUITY_SCORE: 34

## 2025-06-11 NOTE — PLAN OF CARE
Problem: Adult Inpatient Plan of Care  Goal: Absence of Hospital-Acquired Illness or Injury  Intervention: Identify and Manage Fall Risk  Recent Flowsheet Documentation  Taken 6/11/2025 0040 by Tram Cabrera RN  Safety Promotion/Fall Prevention:   assistive device/personal items within reach   clutter free environment maintained   room near nurse's station   room organization consistent   safety round/check completed  Intervention: Prevent Skin Injury  Recent Flowsheet Documentation  Taken 6/11/2025 0040 by Tram Cabrera RN  Body Position: position changed independently  Intervention: Prevent and Manage VTE (Venous Thromboembolism) Risk  Recent Flowsheet Documentation  Taken 6/11/2025 0040 by Tram Cabrera RN  VTE Prevention/Management: SCDs off (sequential compression devices)  Intervention: Prevent Infection  Recent Flowsheet Documentation  Taken 6/11/2025 0040 by Tram Cabrera RN  Infection Prevention: hand hygiene promoted     Problem: Pain Acute  Goal: Optimal Pain Control and Function  Intervention: Prevent or Manage Pain  Recent Flowsheet Documentation  Taken 6/11/2025 0040 by Tram Cabrera RN  Medication Review/Management: medications reviewed     Problem: Nausea and Vomiting  Goal: Nausea and Vomiting Relief  Intervention: Prevent and Manage Nausea and Vomiting  Recent Flowsheet Documentation  Taken 6/11/2025 0040 by Tram Cabrera RN  Nausea/Vomiting Interventions: (denies nause/vomiting.) other (see comments)     Problem: Diarrhea  Goal: Effective Diarrhea Management  Intervention: Manage Diarrhea  Recent Flowsheet Documentation  Taken 6/11/2025 0040 by Tram Caberra RN  Isolation Precautions: contact precautions maintained  Medication Review/Management: medications reviewed  Perineal Care: perineal hygiene encouraged   Goal Outcome Evaluation:        Patient A&O x 4, RA, denied pain, no nausea/vomiting. Remains on enteric precaution.       Patient  reported still having watery stool without abdomen pain and stool is now green from brown X 4.     Up independently to the toilet.    On clear liquid, 5% and 0.9% Nacl infusion running at 75 ml/hr.      Tram PATTERSON RN.

## 2025-06-11 NOTE — PROGRESS NOTES
[facility]    Medicine Progress Note - Hospitalist Service    Date of Admission:  6/10/2025    Assessment & Plan   Acute Gastroenteritis due to Salmonella infection  - Due to severity warrants abx treatment  - currently approx day 7 of acute illness  Plan:  Cont cipro, supportive cares  Follow final blood culture result  Continue IV fluids  Add Ensure as no solid intake so far    Anion Gap Metabolic Acidosis-improving  - due to above, diarrhea etc  Plan:  Cont IVFs, follow labs     Hyponatremia.   -Likely hypovolemic  - Improved after IV fluids    Hypokalemia  Replace per protocol          Diet: Clear Liquid Diet  Snacks/Supplements Adult: Ensure Enlive; Between Meals    DVT Prophylaxis: Low Risk/Ambulatory with no VTE prophylaxis indicated  Gurrola Catheter: Not present  Lines: None     Cardiac Monitoring: None  Code Status: Full Code      Clinically Significant Risk Factors        # Hypokalemia: Lowest K = 3.1 mmol/L in last 2 days, will replace as needed  # Hyponatremia: Lowest Na = 134 mmol/L in last 2 days, will monitor as appropriate   # Hypocalcemia: Lowest Ca = 8.4 mg/dL in last 2 days, will monitor and replace as appropriate                    # Overweight: Estimated body mass index is 27.6 kg/m  as calculated from the following:    Height as of this encounter: 1.524 m (5').    Weight as of this encounter: 64.1 kg (141 lb 5 oz)., PRESENT ON ADMISSION            Social Drivers of Health    Tobacco Use: Medium Risk (8/6/2024)    Patient History     Smoking Tobacco Use: Passive Smoke Exposure - Never Smoker     Smokeless Tobacco Use: Never     Passive Exposure: Yes   Physical Activity: Unknown (8/6/2024)    Exercise Vital Sign     Days of Exercise per Week: 3 days   Stress: Stress Concern Present (8/6/2024)    [employer] - Occupational Stress Questionnaire     Feeling of Stress : To some extent   Social Connections: Unknown (8/6/2024)    Social Connection  and Isolation Panel [NHANES]     Frequency of Social Gatherings with Friends and Family: Once a week          Disposition Plan     Medically Ready for Discharge: Anticipated in 2-4 Days             Clarisa Hernadez MD  Hospitalist Service  [facility]  Securely message with [employer] (more info)  Text page via [employer] Paging/Directory   ______________________________________________________________________    Interval History   Continues to have watery nonbloody stools  Reports abdominal cramping but also has her period.   Has no appetite, has only been drinking water    Physical Exam   Vital Signs: Temp: 98.4  F (36.9  C) Temp src: Oral BP: 99/54 Pulse: 80   Resp: 20 SpO2: 97 % O2 Device: None (Room air)    Weight: 141 lbs 5.04 oz    General Appearance: No acute distress  Respiratory: Respirations unlabored  Cardiovascular: Regular, not tachycardic  GI: Abdomen soft, mildly distended, mild tenderness  Other: Alert, Nonfocal    Medical Decision Making       45 MINUTES SPENT BY ME on the date of service doing chart review, history, exam, documentation & further activities per the note.  MANAGEMENT DISCUSSED with the following over the past 24 hours: Patient, nursing staff       Data     I have personally reviewed the following data over the past 24 hrs:    6.6  \   N/A   / N/A     136 105 13.0 /  125 (H)   3.0 (L) 22 0.90 \       Imaging results reviewed over the past 24 hrs:   No results found for this or any previous visit (from the past 24 hours).

## 2025-06-11 NOTE — PLAN OF CARE
Problem: Pain Acute  Goal: Optimal Pain Control and Function  Outcome: Progressing     Problem: Nausea and Vomiting  Goal: Nausea and Vomiting Relief  Outcome: Progressing     Problem: Diarrhea  Goal: Effective Diarrhea Management  Intervention: Manage Diarrhea  Recent Flowsheet Documentation  Taken 6/10/2025 1800 by Chalo Espinoza RN  Isolation Precautions: contact precautions maintained   Goal Outcome Evaluation:         Patient And O times 4. Independent in her room. Running 75 ml/hr D5 NS. VSS. Frequent episodes of diarrhea but no nausea this shift. On clear liquid diet. Pain managed with PRN tylenol and IV dilaudid. Proved effective. On enteric precautions.

## 2025-06-11 NOTE — PLAN OF CARE
Problem: Adult Inpatient Plan of Care  Goal: Absence of Hospital-Acquired Illness or Injury  Intervention: Prevent Infection  Recent Flowsheet Documentation  Taken 6/11/2025 0901 by Veronika Casiano RN  Infection Prevention: hand hygiene promoted     Problem: Pain Acute  Goal: Optimal Pain Control and Function  Outcome: Progressing  Intervention: Prevent or Manage Pain  Recent Flowsheet Documentation  Taken 6/11/2025 0901 by Veronika Casiano RN  Medication Review/Management: medications reviewed     Problem: Nausea and Vomiting  Goal: Nausea and Vomiting Relief  Outcome: Progressing     Problem: Diarrhea  Goal: Effective Diarrhea Management  Outcome: Progressing  Intervention: Manage Diarrhea  Recent Flowsheet Documentation  Taken 6/11/2025 0901 by Veronika Casiano RN  Isolation Precautions: contact precautions maintained  Medication Review/Management: medications reviewed  Perineal Care: perineal hygiene encouraged   Goal Outcome Evaluation:       Pt is A/O, has had several watery stools today, has no appetite, Ensure was ordered, VSS, on RA, K protocol, D5 NS running at 75/hr

## 2025-06-12 VITALS
HEIGHT: 60 IN | BODY MASS INDEX: 27.74 KG/M2 | HEART RATE: 95 BPM | SYSTOLIC BLOOD PRESSURE: 108 MMHG | WEIGHT: 141.31 LBS | DIASTOLIC BLOOD PRESSURE: 56 MMHG | RESPIRATION RATE: 16 BRPM | OXYGEN SATURATION: 97 % | TEMPERATURE: 98.4 F

## 2025-06-12 LAB
BACTERIA SPEC CULT: NO GROWTH
BACTERIA SPEC CULT: NO GROWTH
HOLD SPECIMEN: NORMAL
POTASSIUM SERPL-SCNC: 4.2 MMOL/L (ref 3.4–5.3)

## 2025-06-12 PROCEDURE — 84132 ASSAY OF SERUM POTASSIUM: CPT | Performed by: INTERNAL MEDICINE

## 2025-06-12 PROCEDURE — 250N000013 HC RX MED GY IP 250 OP 250 PS 637: Performed by: INTERNAL MEDICINE

## 2025-06-12 PROCEDURE — 120N000001 HC R&B MED SURG/OB

## 2025-06-12 PROCEDURE — 99232 SBSQ HOSP IP/OBS MODERATE 35: CPT | Performed by: INTERNAL MEDICINE

## 2025-06-12 PROCEDURE — 36415 COLL VENOUS BLD VENIPUNCTURE: CPT | Performed by: INTERNAL MEDICINE

## 2025-06-12 PROCEDURE — 250N000011 HC RX IP 250 OP 636: Performed by: INTERNAL MEDICINE

## 2025-06-12 RX ADMIN — POTASSIUM CHLORIDE 20 MEQ: 1500 TABLET, EXTENDED RELEASE ORAL at 00:32

## 2025-06-12 RX ADMIN — CIPROFLOXACIN 500 MG: 500 TABLET ORAL at 08:37

## 2025-06-12 RX ADMIN — CIPROFLOXACIN 500 MG: 500 TABLET ORAL at 21:40

## 2025-06-12 RX ADMIN — PANTOPRAZOLE SODIUM 40 MG: 40 INJECTION, POWDER, FOR SOLUTION INTRAVENOUS at 06:00

## 2025-06-12 ASSESSMENT — ACTIVITIES OF DAILY LIVING (ADL)
ADLS_ACUITY_SCORE: 32
ADLS_ACUITY_SCORE: 40
ADLS_ACUITY_SCORE: 36
ADLS_ACUITY_SCORE: 40
ADLS_ACUITY_SCORE: 32
ADLS_ACUITY_SCORE: 40
ADLS_ACUITY_SCORE: 32
ADLS_ACUITY_SCORE: 36
ADLS_ACUITY_SCORE: 40
ADLS_ACUITY_SCORE: 40
ADLS_ACUITY_SCORE: 32
ADLS_ACUITY_SCORE: 40
ADLS_ACUITY_SCORE: 32
ADLS_ACUITY_SCORE: 40
ADLS_ACUITY_SCORE: 32
ADLS_ACUITY_SCORE: 36
ADLS_ACUITY_SCORE: 40
ADLS_ACUITY_SCORE: 40

## 2025-06-12 NOTE — PROGRESS NOTES
[facility]    Medicine Progress Note - Hospitalist Service    Date of Admission:  6/10/2025    Assessment & Plan     19 yo female with no significant PMH admitted with acute gastroenteritis due to Salmonella infection.      Acute Gastroenteritis due to Salmonella infection  No e/o bacteremia   - Due to severity warrants abx treatment  Plan:  Cont ciprofloxacin, would complete 14 day course  Continue supportive cares  Continue IV fluids  Diet as tolerated, added Ensure     Anion Gap Metabolic Acidosis - resolved   - due to above, diarrhea etc  Plan:  Cont IVFs, follow labs     Hyponatremia.   -Likely hypovolemic  - Improved after IV fluids    Hypokalemia  Replace per protocol          Diet: Snacks/Supplements Adult: Ensure Enlive; Between Meals  Regular Diet Adult    DVT Prophylaxis: Low Risk/Ambulatory with no VTE prophylaxis indicated  Gurrola Catheter: Not present  Lines: None     Cardiac Monitoring: None  Code Status: Full Code      Clinically Significant Risk Factors        # Hypokalemia: Lowest K = 3 mmol/L in last 2 days, will replace as needed                       # Overweight: Estimated body mass index is 27.6 kg/m  as calculated from the following:    Height as of this encounter: 1.524 m (5').    Weight as of this encounter: 64.1 kg (141 lb 5 oz)., PRESENT ON ADMISSION            Social Drivers of Health    Tobacco Use: Medium Risk (8/6/2024)    Patient History     Smoking Tobacco Use: Passive Smoke Exposure - Never Smoker     Smokeless Tobacco Use: Never     Passive Exposure: Yes   Physical Activity: Unknown (8/6/2024)    Exercise Vital Sign     Days of Exercise per Week: 3 days   Stress: Stress Concern Present (8/6/2024)    [employer] - Occupational Stress Questionnaire     Feeling of Stress : To some extent   Social Connections: Unknown (8/6/2024)    Social Connection and Isolation Panel [NHANES]     Frequency of Social Gatherings with Friends and  Family: Once a week          Disposition Plan     Medically Ready for Discharge: Anticipated Tomorrow             Clarisa Hernadez MD  Hospitalist Service  [facility]  Securely message with [employer] (more info)  Text page via [employer] Paging/Directory   ______________________________________________________________________    Interval History   Patient reports stool is still watery and frequent but she's no longer incontinent.  No abdominal discomfort today.  Able to eat little more.  Tired    Physical Exam   Vital Signs: Temp: 98.1  F (36.7  C) Temp src: Oral BP: 113/56 Pulse: 96   Resp: 18 SpO2: 98 % O2 Device: None (Room air)    Weight: 141 lbs 5.04 oz    General Appearance: NAD  Respiratory: Easy respirations, lungs clear  Cardiovascular: RRR, normal S1S2, mild tachycardia   GI: Abdomen soft, nondistended, nontender  Other: Alert     Medical Decision Making       38 MINUTES SPENT BY ME on the date of service doing chart review, history, exam, documentation & further activities per the note.  MANAGEMENT DISCUSSED with the following over the past 24 hours: patient        Data     I have personally reviewed the following data over the past 24 hrs:    N/A  \   N/A   / N/A     N/A N/A N/A /  N/A   4.2 N/A N/A \       Imaging results reviewed over the past 24 hrs:   No results found for this or any previous visit (from the past 24 hours).

## 2025-06-12 NOTE — PLAN OF CARE
"  Problem: Adult Inpatient Plan of Care  Goal: Plan of Care Review  Description: The Plan of Care Review/Shift note should be completed every shift.  The Outcome Evaluation is a brief statement about your assessment that the patient is improving, declining, or no change.  This information will be displayed automatically on your shift  note.  Outcome: Progressing     Problem: Adult Inpatient Plan of Care  Goal: Patient-Specific Goal (Individualized)  Description: You can add care plan individualizations to a care plan. Examples of Individualization might be:  \"Parent requests to be called daily at 9am for status\", \"I have a hard time hearing out of my right ear\", or \"Do not touch me to wake me up as it startles  me\".  Outcome: Progressing     Problem: Pain Acute  Goal: Optimal Pain Control and Function  Outcome: Progressing     Problem: Diarrhea  Goal: Effective Diarrhea Management  Outcome: Progressing   Goal Outcome Evaluation:       Pt is alert and oriented x4. VSS. Pain to abdomen 3/10, pt declines pain meds and preferred warm pack, effective. Went to bathroom several times with loose watery green stools. Slept between cares. IV reinforced and cold compress applied.                     "

## 2025-06-12 NOTE — PLAN OF CARE
Problem: Adult Inpatient Plan of Care  Goal: Plan of Care Review  Description: The Plan of Care Review/Shift note should be completed every shift.  The Outcome Evaluation is a brief statement about your assessment that the patient is improving, declining, or no change.  This information will be displayed automatically on your shift  note.  Outcome: Progressing   Goal Outcome Evaluation:                    Educated pt on treatment plan, pt voiced understanding.       Problem: Pain Acute  Goal: Optimal Pain Control and Function  Outcome: Progressing       Denies pain.    Cont. On IVF. Tolerated reg diet.

## 2025-06-12 NOTE — PLAN OF CARE
"Problem: Adult Inpatient Plan of Care  Goal: Plan of Care Review  Description: The Plan of Care Review/Shift note should be completed every shift.  The Outcome Evaluation is a brief statement about your assessment that the patient is improving, declining, or no change.  This information will be displayed automatically on your shift  note.  Outcome: Progressing  Goal: Patient-Specific Goal (Individualized)  Description: You can add care plan individualizations to a care plan. Examples of Individualization might be:  \"Parent requests to be called daily at 9am for status\", \"I have a hard time hearing out of my right ear\", or \"Do not touch me to wake me up as it startles  me\".  Outcome: Progressing  Goal: Absence of Hospital-Acquired Illness or Injury  Outcome: Progressing  Intervention: Identify and Manage Fall Risk  Recent Flowsheet Documentation  Taken 6/11/2025 1742 by Enrique Toledo RN  Safety Promotion/Fall Prevention:   assistive device/personal items within reach   clutter free environment maintained   lighting adjusted   nonskid shoes/slippers when out of bed   patient and family education   safety round/check completed  Goal: Optimal Comfort and Wellbeing  Outcome: Progressing  Intervention: Monitor Pain and Promote Comfort  Recent Flowsheet Documentation  Taken 6/11/2025 2133 by Enrique Toledo, RN  Pain Management Interventions: medication offered but refused  Goal: Readiness for Transition of Care  Outcome: Progressing     Problem: Pain Acute  Goal: Optimal Pain Control and Function  Outcome: Progressing  Intervention: Develop Pain Management Plan  Recent Flowsheet Documentation  Taken 6/11/2025 2133 by Enrique Toledo, RN  Pain Management Interventions: medication offered but refused     Problem: Nausea and Vomiting  Goal: Nausea and Vomiting Relief  Outcome: Progressing     Problem: Diarrhea  Goal: Effective Diarrhea Management  6/12/2025 0009 by Enrique Toledo RN  Outcome: Not Progressing  6/12/2025 0009 by " Enrique Toledo RN  Outcome: Progressing     Problem: Gastroenteritis  Goal: Effective Diarrhea Management  Outcome: Not Progressing  Goal: Fluid and Electrolyte Balance  Outcome: Not Progressing  Goal: Nausea and Vomiting Relief  Outcome: Progressing     Patient alert and oriented. Patient stated she had abdominal discomfort/cramping; refused PRN pain medication when offered. Patient had about 6 watery, green stools this shift; continent of bladder and voiding when in the bathroom. Patient independent and steady with transfers, and able to move IV pole without difficulty. IV D5 and NS infusing at 75 mL/hr. MD advanced diet to regular diet, which patient only ate 25% of supper this shift. Potassium protocol ran and was 3.0 mmol/L; PO potassium protocol given at 2239 with the next dose at 0100 and recheck at 0532.

## 2025-06-13 VITALS
BODY MASS INDEX: 27.74 KG/M2 | HEIGHT: 60 IN | DIASTOLIC BLOOD PRESSURE: 76 MMHG | TEMPERATURE: 98.2 F | HEART RATE: 84 BPM | WEIGHT: 141.31 LBS | OXYGEN SATURATION: 98 % | SYSTOLIC BLOOD PRESSURE: 110 MMHG | RESPIRATION RATE: 18 BRPM

## 2025-06-13 LAB
HOLD SPECIMEN: NORMAL
POTASSIUM SERPL-SCNC: 3.5 MMOL/L (ref 3.4–5.3)

## 2025-06-13 PROCEDURE — 250N000011 HC RX IP 250 OP 636: Performed by: INTERNAL MEDICINE

## 2025-06-13 PROCEDURE — 258N000003 HC RX IP 258 OP 636: Performed by: INTERNAL MEDICINE

## 2025-06-13 PROCEDURE — 99239 HOSP IP/OBS DSCHRG MGMT >30: CPT | Performed by: INTERNAL MEDICINE

## 2025-06-13 PROCEDURE — 250N000013 HC RX MED GY IP 250 OP 250 PS 637: Performed by: INTERNAL MEDICINE

## 2025-06-13 PROCEDURE — 84132 ASSAY OF SERUM POTASSIUM: CPT | Performed by: INTERNAL MEDICINE

## 2025-06-13 PROCEDURE — 36415 COLL VENOUS BLD VENIPUNCTURE: CPT | Performed by: INTERNAL MEDICINE

## 2025-06-13 RX ORDER — POTASSIUM CHLORIDE 1500 MG/1
20 TABLET, EXTENDED RELEASE ORAL ONCE
Status: COMPLETED | OUTPATIENT
Start: 2025-06-13 | End: 2025-06-13

## 2025-06-13 RX ORDER — CIPROFLOXACIN 500 MG/1
500 TABLET, FILM COATED ORAL EVERY 12 HOURS
Qty: 12 TABLET | Refills: 0 | Status: SHIPPED | OUTPATIENT
Start: 2025-06-13 | End: 2025-06-19

## 2025-06-13 RX ADMIN — PANTOPRAZOLE SODIUM 40 MG: 40 INJECTION, POWDER, FOR SOLUTION INTRAVENOUS at 06:49

## 2025-06-13 RX ADMIN — POTASSIUM CHLORIDE 20 MEQ: 1500 TABLET, EXTENDED RELEASE ORAL at 09:41

## 2025-06-13 RX ADMIN — DEXTROSE AND SODIUM CHLORIDE: 5; .9 INJECTION, SOLUTION INTRAVENOUS at 01:29

## 2025-06-13 RX ADMIN — CIPROFLOXACIN 500 MG: 500 TABLET ORAL at 09:41

## 2025-06-13 ASSESSMENT — ACTIVITIES OF DAILY LIVING (ADL)
ADLS_ACUITY_SCORE: 40

## 2025-06-13 NOTE — DISCHARGE SUMMARY
[facility]  Hospitalist Discharge Summary      Date of Admission:  6/10/2025  Date of Discharge:  6/13/2025  5:35 PM  Discharging Provider: Clarisa Hernadez MD  Discharge Service: Hospitalist Service    Discharge Diagnoses   Acute gastroenteritis due to Salmonella infection  Anion gap metabolic acidosis  Lactic acidosis  Hypovolemic hyponatremia  Hypokalemia  Thrombocytosis likely reactive    Clinically Significant Risk Factors     # Overweight: Estimated body mass index is 27.6 kg/m  as calculated from the following:    Height as of this encounter: 1.524 m (5').    Weight as of this encounter: 64.1 kg (141 lb 5 oz).       Follow-ups Needed After Discharge   Follow-up Appointments       Hospital Follow-up with Existing Primary Care Provider (PCP)          Schedule Primary Care visit within: 7 Days               Unresulted Labs Ordered in the Past 30 Days of this Admission       No orders found from 5/11/2025 to 6/11/2025.        These results will be followed up by     Discharge Disposition   Discharged to home  Condition at discharge: Stable    Hospital Course   Alan Hutton is a 19 yo female with no significant PMH who presented to [facility] for evaluation of nausea, vomiting and diarrhea.  She was admitted with acute gastroenteritis due to Salmonella infection.  Symptoms improved significantly with ciprofloxacin.  Discharged home on oral ciprofloxacin x 6 more days.  Hypokalemia replaced.  Acidosis and hyponatremia improved after IV fluids and resolution of diarrhea.        Consultations This Hospital Stay   None    Code Status   Prior    Time Spent on this Encounter   I, Clarisa Hernadez MD, personally saw the patient today and spent greater than 30 minutes discharging this patient.       Clarisa Hernadez MD  [address] [address]  Phone: [phone]  Fax:  [phone]  ______________________________________________________________________    Physical Exam   Vital Signs: Temp: 98.2  F (36.8  C) Temp src: Oral BP: 110/76 Pulse: 84   Resp: 18 SpO2: 98 % O2 Device: None (Room air)    Weight: 141 lbs 5.04 oz         Primary Care Physician   Roma Fernandez    Discharge Orders      Reason for your hospital stay    Acute gastroenteritis due to Salmonella     Activity    Your activity upon discharge: activity as tolerated     Diet    Follow this diet upon discharge: Current Diet:Orders Placed This Encounter      Snacks/Supplements Adult: Ensure Enlive; Between Meals      Regular Diet Adult     Hospital Follow-up with Existing Primary Care Provider (PCP)            Significant Results and Procedures   Most Recent 3 CBC's:  Recent Labs   Lab Test 06/11/25  0542 06/09/25 2213 06/07/25  1624 06/06/25  2245   WBC 6.6 5.0 5.4 12.0*   HGB  --  14.0 11.7 12.5   MCV 81 81 83 83   PLT  --  564* 407 522*     Most Recent 3 BMP's:  Recent Labs   Lab Test 06/13/25  0608 06/12/25  0528 06/11/25  1614 06/11/25  0542 06/09/25 2213 06/07/25  1624   NA  --   --   --  136 134* 134*   POTASSIUM 3.5 4.2 3.0* 3.1* 3.8 3.8   CHLORIDE  --   --   --  105 98 105   CO2  --   --   --  22 20* 16*   BUN  --   --   --  13.0 15.5 11.1   CR  --   --   --  0.90 1.06* 0.91   ANIONGAP  --   --   --  9 16* 13   TAHI  --   --   --  8.4* 9.9 8.8   GLC  --   --   --  125* 103* 113*     7-Day Micro Results       Collected Updated Procedure Result Status      06/10/2025 0155 06/10/2025 0524 Enteric Bacteria and Virus Panel PCR [[id number]]    (Abnormal)   Stool from Per Rectum    Final result Component Value   Campylobacter species Negative   Salmonella species Positive   Vibrio species Negative   Vibrio cholerae Negative   Yersinia enterocolitica Negative   Enteropathogenic E. coli (EPEC) Negative   Shiga-like toxin-producing E. coli (STEC) Negative   Shigella/Enteroinvasive E. coli (EIEC) Negative    Cryptosporidium species Negative   Giardia lamblia Negative   Norovirus Gl/Gll Negative   Rotavirus A Negative   Plesiomonas shigelloides Negative   Enteroaggregative E. coli (EAEC) Negative   Enterotoxigenic E. coli (ETEC) Negative   E. coli O157 NA   Cyclospora cayetanensis Negative   Entamoeba histolytica Negative   Adenovirus F40/41 Negative   Astrovirus Negative   Sapovirus Negative                ,   Results for orders placed or performed during the hospital encounter of 06/07/25   CT Abdomen Pelvis w Contrast    Narrative    EXAM: CT ABDOMEN PELVIS W CONTRAST  LOCATION: [facility]  DATE: 6/7/2025    INDICATION: diffuse pain, fever, N V D  COMPARISON: None.  TECHNIQUE: CT scan of the abdomen and pelvis was performed following injection of IV contrast. Multiplanar reformats were obtained. Dose reduction techniques were used.  CONTRAST: 82ml isovue 370    FINDINGS:   LOWER CHEST: Normal.    HEPATOBILIARY: Normal.    PANCREAS: Normal.    SPLEEN: Normal.    ADRENAL GLANDS: Normal.    KIDNEYS/BLADDER: Normal.    BOWEL: Fluid-filled, nondilated small bowel loops in the lower abdomen and pelvis. Few of these loops appear mildly thick-walled. The colon is unremarkable. The appendix is normal. No free intraperitoneal gas or fluid.    LYMPH NODES: Normal.    VASCULATURE: Normal.    PELVIC ORGANS: There is a 1.5 cm collapsing right ovarian follicle, within physiologic limits.    MUSCULOSKELETAL: Normal.      Impression    IMPRESSION:   1.  Fluid filled, nondilated small bowel loops with mild wall thickening consistent with enteritis.  2.  Small collapsing right ovarian follicle, within physiologic limits.       Discharge Medications   Discharge Medication List as of 6/13/2025  5:22 PM        START taking these medications    Details   ciprofloxacin (CIPRO) 500 MG tablet Take 1 tablet (500 mg) by mouth every 12 hours for 6 days., Disp-12 tablet, R-0, E-Prescribe           CONTINUE these  medications which have NOT CHANGED    Details   clindamycin (CLEOCIN-T) 1 % external gel Apply topically 2 times dailyDisp-30 g, [id number]      dicyclomine (BENTYL) 20 MG tablet Take 1 tablet (20 mg) by mouth 4 times daily as needed (abdominal pain)., Disp-30 tablet, R-0, E-Prescribe      ibuprofen (ADVIL/MOTRIN) 200 MG tablet Take 200 mg by mouth every 4 hours as needed for pain., Historical      loperamide (IMODIUM A-D) 2 MG tablet Take 1 tablet (2 mg) by mouth 4 times daily as needed for diarrhea., Disp-14 tablet, R-0, E-Prescribe      ondansetron (ZOFRAN ODT) 4 MG ODT tab Take 1 tablet (4 mg) by mouth every 8 hours as needed for nausea., Disp-20 tablet, R-0, E-Prescribe      salicylic acid (COMPOUND W MAX STRENGTH) 17 % external gel Apply topically dailyDisp-15 g, [id number]      tretinoin (RETIN-A) 0.01 % external gel Apply topically at bedtimeDisp-45 g, [id number]           Allergies   No Known Allergies

## 2025-06-13 NOTE — PLAN OF CARE
"Problem: Adult Inpatient Plan of Care  Goal: Plan of Care Review  Description: The Plan of Care Review/Shift note should be completed every shift.  The Outcome Evaluation is a brief statement about your assessment that the patient is improving, declining, or no change.  This information will be displayed automatically on your shift  note.  Outcome: Progressing  Goal: Patient-Specific Goal (Individualized)  Description: You can add care plan individualizations to a care plan. Examples of Individualization might be:  \"Parent requests to be called daily at 9am for status\", \"I have a hard time hearing out of my right ear\", or \"Do not touch me to wake me up as it startles  me\".  Outcome: Progressing  Goal: Absence of Hospital-Acquired Illness or Injury  Outcome: Progressing  Intervention: Identify and Manage Fall Risk  Recent Flowsheet Documentation  Taken 6/12/2025 1800 by Enrique Toledo RN  Safety Promotion/Fall Prevention:    assistive device/personal items within reach    clutter free environment maintained    lighting adjusted    nonskid shoes/slippers when out of bed    patient and family education    safety round/check completed  Goal: Optimal Comfort and Wellbeing  Outcome: Progressing  Goal: Readiness for Transition of Care  Outcome: Progressing     Problem: Pain Acute  Goal: Optimal Pain Control and Function  Outcome: Progressing     Problem: Nausea and Vomiting  Goal: Nausea and Vomiting Relief  Outcome: Progressing     Problem: Diarrhea  Goal: Effective Diarrhea Management  Outcome: Progressing     Problem: Gastroenteritis  Goal: Effective Diarrhea Management  Outcome: Progressing  Goal: Fluid and Electrolyte Balance  Outcome: Progressing  Goal: Nausea and Vomiting Relief  Outcome: Progressing     Problem: Electrolyte Imbalance  Goal: Electrolyte Balance  Outcome: Progressing    Patient was alert and oriented. Denied pain thus far. Patient with x2-3 stools this shift. New PIV placed by CYNDI RN via ultrasound. " IV D5 NS infusing at 75 mL/hr. Potassium will be rechecked in the morning per protocol. Patient tolerating regular diet.

## 2025-06-13 NOTE — PLAN OF CARE
"Problem: Adult Inpatient Plan of Care  Goal: Plan of Care Review  Description: The Plan of Care Review/Shift note should be completed every shift.  The Outcome Evaluation is a brief statement about your assessment that the patient is improving, declining, or no change.  This information will be displayed automatically on your shift  note.  Outcome: Progressing  Goal: Patient-Specific Goal (Individualized)  Description: You can add care plan individualizations to a care plan. Examples of Individualization might be:  \"Parent requests to be called daily at 9am for status\", \"I have a hard time hearing out of my right ear\", or \"Do not touch me to wake me up as it startles  me\".  Outcome: Progressing  Goal: Absence of Hospital-Acquired Illness or Injury  Outcome: Progressing  Intervention: Identify and Manage Fall Risk  Recent Flowsheet Documentation  Taken 6/12/2025 1800 by Enrique Toledo RN  Safety Promotion/Fall Prevention:   assistive device/personal items within reach   clutter free environment maintained   lighting adjusted   nonskid shoes/slippers when out of bed   patient and family education   safety round/check completed  Goal: Optimal Comfort and Wellbeing  Outcome: Progressing  Goal: Readiness for Transition of Care  Outcome: Progressing     Problem: Pain Acute  Goal: Optimal Pain Control and Function  Outcome: Progressing     Problem: Nausea and Vomiting  Goal: Nausea and Vomiting Relief  Outcome: Progressing     Problem: Diarrhea  Goal: Effective Diarrhea Management  Outcome: Progressing     Problem: Gastroenteritis  Goal: Effective Diarrhea Management  Outcome: Progressing  Goal: Fluid and Electrolyte Balance  Outcome: Progressing  Goal: Nausea and Vomiting Relief  Outcome: Progressing     Problem: Electrolyte Imbalance  Goal: Electrolyte Balance  Outcome: Progressing    Patient was alert and oriented. Denied pain thus far. Patient with x2-3 stools this shift. New PIV placed by CYNDI RN via ultrasound. IV D5 " NS infusing at 75 mL/hr. Potassium will be rechecked in the morning per protocol. Patient tolerating regular diet. Patient independent in the room during transfers and cares.

## 2025-06-13 NOTE — PLAN OF CARE
"  Problem: Adult Inpatient Plan of Care  Goal: Plan of Care Review  Description: The Plan of Care Review/Shift note should be completed every shift.  The Outcome Evaluation is a brief statement about your assessment that the patient is improving, declining, or no change.  This information will be displayed automatically on your shift  note.  Outcome: Progressing     Problem: Adult Inpatient Plan of Care  Goal: Patient-Specific Goal (Individualized)  Description: You can add care plan individualizations to a care plan. Examples of Individualization might be:  \"Parent requests to be called daily at 9am for status\", \"I have a hard time hearing out of my right ear\", or \"Do not touch me to wake me up as it startles  me\".  Outcome: Progressing     Problem: Pain Acute  Goal: Optimal Pain Control and Function  Outcome: Progressing   Goal Outcome Evaluation:  Pt is alert and oriented x4. Denies pain. VSS. Slept well. IV D5 0.9% Sodium Chloride at 75 ml/hr infusing. Pt is independent in the room claimed went to pee and poop during shift.                          "

## 2025-06-13 NOTE — PLAN OF CARE
Problem: Adult Inpatient Plan of Care  Goal: Plan of Care Review  Description: The Plan of Care Review/Shift note should be completed every shift.  The Outcome Evaluation is a brief statement about your assessment that the patient is improving, declining, or no change.  This information will be displayed automatically on your shift  note.  Outcome: Progressing   Goal Outcome Evaluation:                          Discussed treatment plan with pt, pt voiced understanding.      Problem: Pain Acute  Goal: Optimal Pain Control and Function  Outcome: Progressing   Denies pain.

## 2025-06-13 NOTE — PROGRESS NOTES
Discussed discharge paperwork with pt, pt voiced understanding. Personal belongings sent with pt. Pt discharged via w/c accompanied by aide & family member whom was present to transport pt home.

## 2025-06-17 ENCOUNTER — PATIENT OUTREACH (OUTPATIENT)
Dept: CARE COORDINATION | Facility: CLINIC | Age: 20
End: 2025-06-17
Payer: COMMERCIAL

## 2025-06-17 NOTE — PROGRESS NOTES
Connected Care Resource Center Contact  [facility]/Voicemail     Clinical Data: Post-Discharge Outreach     Outreach attempted x 2.  Left message on patient's voicemail, providing [facility]'s central phone number of [phone] ([phone]) for questions/concerns and/or to schedule an appt with an [facility] provider, if they do not have a PCP.      Plan:  [facility] will do no further outreaches at this time.       JADA Tavares  Connected Care Resource Center, [facility]    *Connected Care Resource Team does NOT follow patient ongoing. Referrals are identified based on internal discharge reports and the outreach is to ensure patient has an understanding of their discharge instructions.

## 2025-06-19 LAB

## 2025-06-23 ENCOUNTER — OFFICE VISIT (OUTPATIENT)
Dept: FAMILY MEDICINE | Facility: CLINIC | Age: 20
End: 2025-06-23
Payer: COMMERCIAL

## 2025-06-23 VITALS
HEART RATE: 122 BPM | OXYGEN SATURATION: 100 % | HEIGHT: 60 IN | RESPIRATION RATE: 20 BRPM | WEIGHT: 165.08 LBS | SYSTOLIC BLOOD PRESSURE: 121 MMHG | DIASTOLIC BLOOD PRESSURE: 86 MMHG | BODY MASS INDEX: 32.41 KG/M2 | TEMPERATURE: 98.6 F

## 2025-06-23 DIAGNOSIS — Z11.59 NEED FOR HEPATITIS C SCREENING TEST: ICD-10-CM

## 2025-06-23 DIAGNOSIS — Z09 HOSPITAL DISCHARGE FOLLOW-UP: Primary | ICD-10-CM

## 2025-06-23 DIAGNOSIS — Z23 ENCOUNTER FOR IMMUNIZATION: ICD-10-CM

## 2025-06-23 DIAGNOSIS — Z11.4 SCREENING FOR HIV (HUMAN IMMUNODEFICIENCY VIRUS): ICD-10-CM

## 2025-06-23 PROBLEM — R42 LIGHTHEADEDNESS: Status: RESOLVED | Noted: 2025-06-07 | Resolved: 2025-06-23

## 2025-06-23 PROBLEM — E86.1 HYPOTENSION DUE TO HYPOVOLEMIA: Status: RESOLVED | Noted: 2025-06-07 | Resolved: 2025-06-23

## 2025-06-23 PROBLEM — R19.7 VOMITING AND DIARRHEA: Status: RESOLVED | Noted: 2025-06-07 | Resolved: 2025-06-23

## 2025-06-23 PROBLEM — R19.7 DIARRHEA, UNSPECIFIED TYPE: Status: RESOLVED | Noted: 2025-06-10 | Resolved: 2025-06-23

## 2025-06-23 PROBLEM — R11.10 VOMITING AND DIARRHEA: Status: RESOLVED | Noted: 2025-06-07 | Resolved: 2025-06-23

## 2025-06-23 PROBLEM — R11.2 NAUSEA AND VOMITING, UNSPECIFIED VOMITING TYPE: Status: RESOLVED | Noted: 2025-06-10 | Resolved: 2025-06-23

## 2025-06-23 LAB
ERYTHROCYTE [DISTWIDTH] IN BLOOD BY AUTOMATED COUNT: 13.5 % (ref 10–15)
HCT VFR BLD AUTO: 39.2 % (ref 35–47)
HGB BLD-MCNC: 12.7 G/DL (ref 11.7–15.7)
HIV 1+2 AB+HIV1 P24 AG SERPL QL IA: NONREACTIVE
MCH RBC QN AUTO: 27.3 PG (ref 26.5–33)
MCHC RBC AUTO-ENTMCNC: 32.4 G/DL (ref 31.5–36.5)
MCV RBC AUTO: 84 FL (ref 78–100)
PLATELET # BLD AUTO: 749 10E3/UL (ref 150–450)
RBC # BLD AUTO: 4.65 10E6/UL (ref 3.8–5.2)
WBC # BLD AUTO: 7.2 10E3/UL (ref 4–11)

## 2025-06-23 PROCEDURE — 86803 HEPATITIS C AB TEST: CPT

## 2025-06-23 PROCEDURE — 85027 COMPLETE CBC AUTOMATED: CPT

## 2025-06-23 PROCEDURE — 90620 MENB-4C VACCINE IM: CPT

## 2025-06-23 PROCEDURE — 3079F DIAST BP 80-89 MM HG: CPT

## 2025-06-23 PROCEDURE — 1111F DSCHRG MED/CURRENT MED MERGE: CPT

## 2025-06-23 PROCEDURE — 36415 COLL VENOUS BLD VENIPUNCTURE: CPT

## 2025-06-23 PROCEDURE — 3074F SYST BP LT 130 MM HG: CPT

## 2025-06-23 PROCEDURE — 99213 OFFICE O/P EST LOW 20 MIN: CPT | Mod: 25

## 2025-06-23 PROCEDURE — 87389 HIV-1 AG W/HIV-1&-2 AB AG IA: CPT

## 2025-06-23 PROCEDURE — 90471 IMMUNIZATION ADMIN: CPT

## 2025-06-23 NOTE — PROGRESS NOTES
Assessment & Plan     Hospital discharge follow-up  - Recent hospitalization due to an infection, likely from contaminated food or water. Symptoms have resolved, and lab values have normalized.  - Check platelet count to ensure it is not still high. No need to recheck potassium as it normalized before discharge except platelets which will be rechecked today to ensure post-infection have returned to normal.   - CBC with platelets    Screening for HIV (human immunodeficiency virus)  - Routine screening recommended.  - Perform HIV screening during the current visit.  - HIV Antigen Antibody Combo    Need for hepatitis C screening test  - Routine screening recommended.  - Perform hepatitis C screening during the current visit.  - Hepatitis C Screen Reflex to HCV RNA Quant and Genotype    Encounter for immunization  - Meningitis vaccination recommended due to college attendance and potential dormitory living.  - Administer the first dose of the meningitis vaccine. Discussed the option of the COVID vaccine, but the patient declined at this time.   - MENINGOCOCCAL B (BEXSERO )      MED REC REQUIRED  Post Medication Reconciliation Status:  Discharge medications reconciled and changed, see notes/orders  BMI  Estimated body mass index is 32.24 kg/m  as calculated from the following:    Height as of this encounter: 1.524 m (5').    Weight as of this encounter: 74.9 kg (165 lb 1.3 oz).   Weight management plan: Discussed healthy diet and exercise guidelines      Please seek immediate medical attention (go to the emergency room or urgent care) if symptoms worsen, or for concerning changes.    Follow-up if symptoms do not improve as anticipated and as needed for acute concern      Subjective   Hsat is a 20 year old, presenting for the following health issues:  Hospital F/U (6/10/2025 - 6/13/2025 (3 days)/[facility]///)        6/23/2025     2:31 PM   Additional Questions   Roomed by SARAVANAN ADAMS CMA    Accompanied by Other     HPI     Alan Hutton, a 20-year-old female, was recently hospitalized approximately 12 days ago. During her hospital stay, she was diagnosed with Salmonella gastroenteritis, possibly due to contaminated food or water, which led to symptoms such as diarrhea and nausea. She completed a course of antibiotics prescribed for the infection but did not take additional medications for diarrhea or nausea, such as Zofran, as she did not feel the need. Her bowel movements returned to normal about a week ago, following the completion of the antibiotics. During her hospitalization, her potassium levels were low due to dehydration from vomiting and diarrhea, but they normalized before discharge. Her white blood cell count was initially high, indicating an infection, but it returned to normal levels. Her platelet count was slightly elevated upon discharge, possibly due to inflammation.     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 She experienced significant illness following her second COVID-19 vaccination, which has made her hesitant about receiving further doses. She is considering additional vaccinations required for her college program.  And would like the meningitis B vaccine today      Hospital Follow-up Visit:    Hospital/Nursing Home/IP Rehab Facility: [facility]  Most Recent Admission Date: 6/10/2025   Most Recent Admission Diagnosis: Gastroenteritis - K52.9  Diarrhea, unspecified type - R19.7  Nausea and vomiting, unspecified vomiting type - R11.2     Most Recent Discharge Date: 6/13/2025   Most Recent Discharge Diagnosis: Gastroenteritis - K52.9  Nausea and vomiting, unspecified vomiting type - R11.2  Diarrhea, unspecified type - R19.7  Salmonella gastroenteritis - A02.0   Do you have any other stressors you would like to discuss with your provider? No    Problems taking medications regularly:  None  Medication  changes since discharge: None  Problems adhering to non-medication therapy:  None    Summary of hospitalization:  [facility] discharge summary reviewed  Diagnostic Tests/Treatments reviewed.  Follow up needed: none  Other Healthcare Providers Involved in Patient s Care:         None  Update since discharge: improved.         Plan of care communicated with patient               Objective    /86 (BP Location: Left arm, Patient Position: Sitting, Cuff Size: Adult Regular)   Pulse (!) 122   Temp 98.6  F (37  C) (Oral)   Resp 20   Ht 1.524 m (5')   Wt 74.9 kg (165 lb 1.3 oz)   LMP 05/12/2025   SpO2 100%   BMI 32.24 kg/m    Body mass index is 32.24 kg/m .  Physical Exam   GENERAL: alert and no distress  RESP: lungs clear to auscultation - no rales, rhonchi or wheezes  CV: regular rate and rhythm, normal S1 S2, no S3 or S4, no murmur, click or rub, no peripheral edema  PSYCH: mentation appears normal, affect normal/bright    Admission on 06/10/2025, Discharged on 06/13/2025   Component Date Value Ref Range Status    Sodium 06/09/2025 134 (L)  135 - 145 mmol/L Final    Potassium 06/09/2025 3.8  3.4 - 5.3 mmol/L Final    Carbon Dioxide (CO2) 06/09/2025 20 (L)  22 - 29 mmol/L Final    Anion Gap 06/09/2025 16 (H)  7 - 15 mmol/L Final    Urea Nitrogen 06/09/2025 15.5  6.0 - 20.0 mg/dL Final    Creatinine 06/09/2025 1.06 (H)  0.51 - 0.95 mg/dL Final    GFR Estimate 06/09/2025 76  >60 mL/min/1.73m2 Final    eGFR calculated using 2021 CKD-EPI equation.    Calcium 06/09/2025 9.9  8.8 - 10.4 mg/dL Final    Chloride 06/09/2025 98  98 - 107 mmol/L Final    Glucose 06/09/2025 103 (H)  70 - 99 mg/dL Final    Alkaline Phosphatase 06/09/2025 60  40 - 150 U/L Final    AST 06/09/2025 30  0 - 45 U/L Final    ALT 06/09/2025 41  0 - 50 U/L Final    Protein Total 06/09/2025 9.0 (H)  6.4 - 8.3 g/dL Final    Albumin 06/09/2025 4.4  3.5 - 5.2 g/dL Final    Bilirubin Total 06/09/2025 0.3  <=1.2 mg/dL Final    WBC Count  06/09/2025 5.0  4.0 - 11.0 10e3/uL Final    RBC Count 06/09/2025 5.15  3.80 - 5.20 10e6/uL Final    Hemoglobin 06/09/2025 14.0  11.7 - 15.7 g/dL Final    Hematocrit 06/09/2025 41.6  35.0 - 47.0 % Final    MCV 06/09/2025 81  78 - 100 fL Final    MCH 06/09/2025 27.2  26.5 - 33.0 pg Final    MCHC 06/09/2025 33.7  31.5 - 36.5 g/dL Final    RDW 06/09/2025 12.6  10.0 - 15.0 % Final    Platelet Count 06/09/2025 564 (H)  150 - 450 10e3/uL Final    Magnesium 06/09/2025 2.3  1.7 - 2.3 mg/dL Final    Campylobacter species 06/10/2025 Negative  Negative Final    Salmonella species 06/10/2025 Positive (A)  Negative Final    Vibrio species 06/10/2025 Negative  Negative Final    Vibrio cholerae 06/10/2025 Negative  Negative Final    Yersinia enterocolitica 06/10/2025 Negative  Negative Final    Enteropathogenic E. coli (EPEC) 06/10/2025 Negative  Negative, NA Final    Shiga-like toxin-producing E. coli* 06/10/2025 Negative  Negative Final    Shigella/Enteroinvasive E. coli (E* 06/10/2025 Negative  Negative Final    Cryptosporidium species 06/10/2025 Negative  Negative Final    Giardia lamblia 06/10/2025 Negative  Negative Final    Norovirus Gl/Gll 06/10/2025 Negative  Negative Final    Rotavirus A 06/10/2025 Negative  Negative Final    Plesiomonas shigelloides 06/10/2025 Negative  Negative Final    Enteroaggregative E. coli (EAEC) 06/10/2025 Negative  Negative Final    Enterotoxigenic E. coli (ETEC) 06/10/2025 Negative  Negative Final    E. coli O157 06/10/2025 NA  Negative, NA Final    Cyclospora cayetanensis 06/10/2025 Negative  Negative Final    Entamoeba histolytica 06/10/2025 Negative  Negative Final    Adenovirus F40/41 06/10/2025 Negative  Negative Final    Astrovirus 06/10/2025 Negative  Negative Final    Sapovirus 06/10/2025 Negative  Negative Final    Hold Specimen 06/09/2025 [facility]   Final    Hold Specimen 06/09/2025 [facility]   Final    Sodium 06/11/2025 136  135 - 145 mmol/L Final    Potassium 06/11/2025 3.1 (L)  3.4 - 5.3  mmol/L Final    Chloride 06/11/2025 105  98 - 107 mmol/L Final    Carbon Dioxide (CO2) 06/11/2025 22  22 - 29 mmol/L Final    Anion Gap 06/11/2025 9  7 - 15 mmol/L Final    Urea Nitrogen 06/11/2025 13.0  6.0 - 20.0 mg/dL Final    Creatinine 06/11/2025 0.90  0.51 - 0.95 mg/dL Final    GFR Estimate 06/11/2025 >90  >60 mL/min/1.73m2 Final    eGFR calculated using 2021 CKD-EPI equation.    Calcium 06/11/2025 8.4 (L)  8.8 - 10.4 mg/dL Final    Glucose 06/11/2025 125 (H)  70 - 99 mg/dL Final    WBC Count 06/11/2025 6.6  4.0 - 11.0 10e3/uL Final    MCV 06/11/2025 81  78 - 100 fL Final    Magnesium 06/11/2025 2.1  1.7 - 2.3 mg/dL Final    Potassium 06/11/2025 3.0 (L)  3.4 - 5.3 mmol/L Final    Potassium 06/12/2025 4.2  3.4 - 5.3 mmol/L Final    Specimen slightly hemolyzed. The reported potassium value may be falsely elevated. Analysis of a non-hemolyzed specimen (i.e. re-draw) may result in a lower potassium value.    Hold Specimen 06/12/2025 [facility]   Final    Potassium 06/13/2025 3.5  3.4 - 5.3 mmol/L Final    Hold Specimen 06/13/2025 [facility]   Final     No results found for this or any previous visit (from the past 24 hours).        Signed Electronically by: BEBA Wong CNP

## 2025-06-23 NOTE — PATIENT INSTRUCTIONS
Thank you for choosing the [facility], it was a pleasure to see you today!    Please take a look at the information below for more specific details regarding the treatment plan and recommendations.    -In this after visit summary is a list of your medications and specific instructions.  Please review this carefully as there may be changes made to your medication list.  -If there are any particular questions or concerns, please feel free to reach out.  -If any labs have been completed, we will reach out to you about results.  If the results are normal or not concerning, a letter or [employer] message will be sent to you.  If any follow-up is needed, either RJ or the nurse will give you a call.  If you have not heard regarding results after 2 weeks, please reach out to the clinic.           Please seek immediate medical attention (go to the emergency room or urgent care) for the following reasons: worsening symptoms or any concerning changes.      --------------------------------------------------------------------------------------------------------------------    -We are always looking for ways to improve.  You may be selected to receive a survey regarding your visit today.  We encourage you to complete the survey and provide specific, constructive feedback to help us improve our processes. If you answer the survey/phone call this will also prompt them to STOP calling you, otherwise they will try again each day for several day! Thank you for your time!    -Please review the contact information listed on the after visit summary and in the electronic chart.  Below is the phone number that we have on file.  If there are any changes that are needed to be made, please reach out to the clinic.  [phone] (home)

## 2025-06-24 ENCOUNTER — RESULTS FOLLOW-UP (OUTPATIENT)
Dept: FAMILY MEDICINE | Facility: CLINIC | Age: 20
End: 2025-06-24

## 2025-06-24 DIAGNOSIS — D75.839 THROMBOCYTOSIS: Primary | ICD-10-CM

## 2025-06-24 LAB — HCV AB SERPL QL IA: NONREACTIVE

## 2025-07-07 ENCOUNTER — PATIENT OUTREACH (OUTPATIENT)
Dept: CARE COORDINATION | Facility: CLINIC | Age: 20
End: 2025-07-07
Payer: COMMERCIAL

## 2025-08-14 ENCOUNTER — OFFICE VISIT (OUTPATIENT)
Dept: FAMILY MEDICINE | Facility: CLINIC | Age: 20
End: 2025-08-14
Payer: COMMERCIAL

## 2025-08-14 VITALS
SYSTOLIC BLOOD PRESSURE: 124 MMHG | WEIGHT: 172 LBS | RESPIRATION RATE: 18 BRPM | HEIGHT: 60 IN | HEART RATE: 82 BPM | TEMPERATURE: 97.5 F | DIASTOLIC BLOOD PRESSURE: 84 MMHG | OXYGEN SATURATION: 99 % | BODY MASS INDEX: 33.77 KG/M2

## 2025-08-14 DIAGNOSIS — Z11.1 SCREENING EXAMINATION FOR PULMONARY TUBERCULOSIS: Primary | ICD-10-CM

## 2025-08-14 DIAGNOSIS — L70.0 ACNE VULGARIS: ICD-10-CM

## 2025-08-14 RX ORDER — CLINDAMYCIN PHOSPHATE 10 MG/G
GEL TOPICAL 2 TIMES DAILY
Qty: 30 G | Refills: 3 | Status: SHIPPED | OUTPATIENT
Start: 2025-08-14

## 2025-08-16 LAB
GAMMA INTERFERON BACKGROUND BLD IA-ACNC: 0.11 IU/ML
M TB IFN-G BLD-IMP: NEGATIVE
M TB IFN-G CD4+ BCKGRND COR BLD-ACNC: 9.89 IU/ML
MITOGEN IGNF BCKGRD COR BLD-ACNC: 0.02 IU/ML
MITOGEN IGNF BCKGRD COR BLD-ACNC: 0.02 IU/ML
QUANTIFERON MITOGEN: 10 IU/ML
QUANTIFERON NIL TUBE: 0.11 IU/ML
QUANTIFERON TB1 TUBE: 0.13 IU/ML
QUANTIFERON TB2 TUBE: 0.13